# Patient Record
Sex: FEMALE | URBAN - METROPOLITAN AREA
[De-identification: names, ages, dates, MRNs, and addresses within clinical notes are randomized per-mention and may not be internally consistent; named-entity substitution may affect disease eponyms.]

---

## 2020-01-01 ENCOUNTER — HOSPITAL ENCOUNTER (OUTPATIENT)
Facility: MEDICAL CENTER | Age: 60
DRG: 388 | End: 2020-05-18
Payer: COMMERCIAL

## 2020-01-01 ENCOUNTER — APPOINTMENT (OUTPATIENT)
Dept: RADIOLOGY | Facility: MEDICAL CENTER | Age: 60
DRG: 388 | End: 2020-01-01
Attending: SURGERY
Payer: COMMERCIAL

## 2020-01-01 ENCOUNTER — APPOINTMENT (OUTPATIENT)
Dept: RADIOLOGY | Facility: MEDICAL CENTER | Age: 60
DRG: 388 | End: 2020-01-01
Attending: HOSPITALIST
Payer: COMMERCIAL

## 2020-01-01 ENCOUNTER — HOSPITAL ENCOUNTER (INPATIENT)
Facility: MEDICAL CENTER | Age: 60
LOS: 5 days | DRG: 388 | End: 2020-05-23
Attending: HOSPITALIST | Admitting: HOSPITALIST
Payer: COMMERCIAL

## 2020-01-01 ENCOUNTER — HOSPITAL ENCOUNTER (OUTPATIENT)
Dept: RADIOLOGY | Facility: MEDICAL CENTER | Age: 60
End: 2020-06-12
Payer: COMMERCIAL

## 2020-01-01 ENCOUNTER — APPOINTMENT (OUTPATIENT)
Dept: CARDIOLOGY | Facility: MEDICAL CENTER | Age: 60
DRG: 388 | End: 2020-01-01
Attending: HOSPITALIST
Payer: COMMERCIAL

## 2020-01-01 ENCOUNTER — APPOINTMENT (OUTPATIENT)
Dept: RADIOLOGY | Facility: MEDICAL CENTER | Age: 60
DRG: 388 | End: 2020-01-01
Attending: PHYSICIAN ASSISTANT
Payer: COMMERCIAL

## 2020-01-01 ENCOUNTER — PATIENT OUTREACH (OUTPATIENT)
Dept: HEALTH INFORMATION MANAGEMENT | Facility: OTHER | Age: 60
End: 2020-01-01

## 2020-01-01 VITALS
DIASTOLIC BLOOD PRESSURE: 77 MMHG | RESPIRATION RATE: 18 BRPM | HEIGHT: 62 IN | TEMPERATURE: 98.7 F | OXYGEN SATURATION: 99 % | SYSTOLIC BLOOD PRESSURE: 137 MMHG | HEART RATE: 67 BPM | WEIGHT: 101.19 LBS | BODY MASS INDEX: 18.62 KG/M2

## 2020-01-01 DIAGNOSIS — S22.42XA CLOSED FRACTURE OF MULTIPLE RIBS OF LEFT SIDE, INITIAL ENCOUNTER: ICD-10-CM

## 2020-01-01 DIAGNOSIS — J44.1 CHRONIC OBSTRUCTIVE PULMONARY DISEASE WITH ACUTE EXACERBATION (HCC): ICD-10-CM

## 2020-01-01 DIAGNOSIS — F51.01 PRIMARY INSOMNIA: ICD-10-CM

## 2020-01-01 LAB
ALBUMIN SERPL BCP-MCNC: 3.6 G/DL (ref 3.2–4.9)
ALBUMIN SERPL BCP-MCNC: 4.1 G/DL (ref 3.2–4.9)
ALBUMIN/GLOB SERPL: 2.1 G/DL
ALBUMIN/GLOB SERPL: 2.3 G/DL
ALP SERPL-CCNC: 66 U/L (ref 30–99)
ALP SERPL-CCNC: 80 U/L (ref 30–99)
ALT SERPL-CCNC: 13 U/L (ref 2–50)
ALT SERPL-CCNC: 19 U/L (ref 2–50)
AMYLASE FLD-CCNC: 8 U/L
ANION GAP SERPL CALC-SCNC: 10 MMOL/L (ref 7–16)
ANION GAP SERPL CALC-SCNC: 10 MMOL/L (ref 7–16)
ANION GAP SERPL CALC-SCNC: 12 MMOL/L (ref 7–16)
ANION GAP SERPL CALC-SCNC: 7 MMOL/L (ref 7–16)
ANION GAP SERPL CALC-SCNC: 9 MMOL/L (ref 7–16)
APPEARANCE FLD: CLEAR
AST SERPL-CCNC: 14 U/L (ref 12–45)
AST SERPL-CCNC: 8 U/L (ref 12–45)
BACTERIA FLD AEROBE CULT: NORMAL
BASOPHILS # BLD AUTO: 0.1 % (ref 0–1.8)
BASOPHILS # BLD AUTO: 0.1 % (ref 0–1.8)
BASOPHILS # BLD AUTO: 0.2 % (ref 0–1.8)
BASOPHILS # BLD: 0.01 K/UL (ref 0–0.12)
BASOPHILS # BLD: 0.01 K/UL (ref 0–0.12)
BASOPHILS # BLD: 0.02 K/UL (ref 0–0.12)
BILIRUB SERPL-MCNC: 0.5 MG/DL (ref 0.1–1.5)
BILIRUB SERPL-MCNC: 0.6 MG/DL (ref 0.1–1.5)
BODY FLD TYPE: NORMAL
BUN SERPL-MCNC: 11 MG/DL (ref 8–22)
BUN SERPL-MCNC: 14 MG/DL (ref 8–22)
BUN SERPL-MCNC: 14 MG/DL (ref 8–22)
BUN SERPL-MCNC: 15 MG/DL (ref 8–22)
BUN SERPL-MCNC: 16 MG/DL (ref 8–22)
CALCIUM SERPL-MCNC: 8.3 MG/DL (ref 8.5–10.5)
CALCIUM SERPL-MCNC: 8.6 MG/DL (ref 8.5–10.5)
CALCIUM SERPL-MCNC: 8.9 MG/DL (ref 8.5–10.5)
CALCIUM SERPL-MCNC: 9.1 MG/DL (ref 8.5–10.5)
CALCIUM SERPL-MCNC: 9.1 MG/DL (ref 8.5–10.5)
CHLORIDE SERPL-SCNC: 92 MMOL/L (ref 96–112)
CHLORIDE SERPL-SCNC: 93 MMOL/L (ref 96–112)
CHLORIDE SERPL-SCNC: 94 MMOL/L (ref 96–112)
CHLORIDE SERPL-SCNC: 95 MMOL/L (ref 96–112)
CHLORIDE SERPL-SCNC: 96 MMOL/L (ref 96–112)
CHOLEST SERPL-MCNC: 189 MG/DL (ref 100–199)
CO2 SERPL-SCNC: 28 MMOL/L (ref 20–33)
CO2 SERPL-SCNC: 29 MMOL/L (ref 20–33)
COLOR FLD: YELLOW
CREAT SERPL-MCNC: 0.36 MG/DL (ref 0.5–1.4)
CREAT SERPL-MCNC: 0.42 MG/DL (ref 0.5–1.4)
CREAT SERPL-MCNC: 0.44 MG/DL (ref 0.5–1.4)
CREAT SERPL-MCNC: 0.44 MG/DL (ref 0.5–1.4)
CREAT SERPL-MCNC: 0.46 MG/DL (ref 0.5–1.4)
CSF COMMENTS 1658: NORMAL
CYTOLOGY REG CYTOL: NORMAL
EKG IMPRESSION: NORMAL
EOSINOPHIL # BLD AUTO: 0 K/UL (ref 0–0.51)
EOSINOPHIL # BLD AUTO: 0.01 K/UL (ref 0–0.51)
EOSINOPHIL # BLD AUTO: 0.02 K/UL (ref 0–0.51)
EOSINOPHIL NFR BLD: 0 % (ref 0–6.9)
EOSINOPHIL NFR BLD: 0.1 % (ref 0–6.9)
EOSINOPHIL NFR BLD: 0.2 % (ref 0–6.9)
ERYTHROCYTE [DISTWIDTH] IN BLOOD BY AUTOMATED COUNT: 43.8 FL (ref 35.9–50)
ERYTHROCYTE [DISTWIDTH] IN BLOOD BY AUTOMATED COUNT: 45.7 FL (ref 35.9–50)
ERYTHROCYTE [DISTWIDTH] IN BLOOD BY AUTOMATED COUNT: 47.5 FL (ref 35.9–50)
EST. AVERAGE GLUCOSE BLD GHB EST-MCNC: 117 MG/DL
FUNGUS SPEC CULT: NORMAL
GLOBULIN SER CALC-MCNC: 1.6 G/DL (ref 1.9–3.5)
GLOBULIN SER CALC-MCNC: 2 G/DL (ref 1.9–3.5)
GLUCOSE FLD-MCNC: 117 MG/DL
GLUCOSE SERPL-MCNC: 102 MG/DL (ref 65–99)
GLUCOSE SERPL-MCNC: 105 MG/DL (ref 65–99)
GLUCOSE SERPL-MCNC: 113 MG/DL (ref 65–99)
GLUCOSE SERPL-MCNC: 84 MG/DL (ref 65–99)
GLUCOSE SERPL-MCNC: 85 MG/DL (ref 65–99)
GRAM STN SPEC: NORMAL
GRAM STN SPEC: NORMAL
HBA1C MFR BLD: 5.7 % (ref 0–5.6)
HCT VFR BLD AUTO: 37 % (ref 37–47)
HCT VFR BLD AUTO: 37.8 % (ref 37–47)
HCT VFR BLD AUTO: 42.5 % (ref 37–47)
HDLC SERPL-MCNC: 58 MG/DL
HGB BLD-MCNC: 12 G/DL (ref 12–16)
HGB BLD-MCNC: 12.5 G/DL (ref 12–16)
HGB BLD-MCNC: 13.9 G/DL (ref 12–16)
HISTIOCYTES NFR FLD: 10 %
IMM GRANULOCYTES # BLD AUTO: 0.03 K/UL (ref 0–0.11)
IMM GRANULOCYTES # BLD AUTO: 0.05 K/UL (ref 0–0.11)
IMM GRANULOCYTES # BLD AUTO: 0.06 K/UL (ref 0–0.11)
IMM GRANULOCYTES NFR BLD AUTO: 0.3 % (ref 0–0.9)
IMM GRANULOCYTES NFR BLD AUTO: 0.5 % (ref 0–0.9)
IMM GRANULOCYTES NFR BLD AUTO: 0.5 % (ref 0–0.9)
INR PPP: 1.04 (ref 0.87–1.13)
LACTATE BLD-SCNC: 0.9 MMOL/L (ref 0.5–2)
LACTATE BLD-SCNC: 1 MMOL/L (ref 0.5–2)
LACTATE BLD-SCNC: 1.1 MMOL/L (ref 0.5–2)
LACTATE BLD-SCNC: 1.3 MMOL/L (ref 0.5–2)
LDH FLD L TO P-CCNC: 50 U/L
LDLC SERPL CALC-MCNC: 93 MG/DL
LV EJECT FRACT  99904: 30
LV EJECT FRACT MOD 2C 99903: 49.42
LV EJECT FRACT MOD 4C 99902: 51.45
LV EJECT FRACT MOD BP 99901: 48.65
LYMPHOCYTES # BLD AUTO: 0.37 K/UL (ref 1–4.8)
LYMPHOCYTES # BLD AUTO: 1.03 K/UL (ref 1–4.8)
LYMPHOCYTES # BLD AUTO: 1.27 K/UL (ref 1–4.8)
LYMPHOCYTES NFR BLD: 11.7 % (ref 22–41)
LYMPHOCYTES NFR BLD: 3.7 % (ref 22–41)
LYMPHOCYTES NFR BLD: 8.7 % (ref 22–41)
LYMPHOCYTES NFR FLD: 21 %
MAGNESIUM SERPL-MCNC: 2.4 MG/DL (ref 1.5–2.5)
MCH RBC QN AUTO: 31.9 PG (ref 27–33)
MCH RBC QN AUTO: 32 PG (ref 27–33)
MCH RBC QN AUTO: 32 PG (ref 27–33)
MCHC RBC AUTO-ENTMCNC: 32.4 G/DL (ref 33.6–35)
MCHC RBC AUTO-ENTMCNC: 32.7 G/DL (ref 33.6–35)
MCHC RBC AUTO-ENTMCNC: 33.1 G/DL (ref 33.6–35)
MCV RBC AUTO: 96.4 FL (ref 81.4–97.8)
MCV RBC AUTO: 97.7 FL (ref 81.4–97.8)
MCV RBC AUTO: 98.7 FL (ref 81.4–97.8)
MESOTHL CELL NFR FLD: 4 %
MONOCYTES # BLD AUTO: 0.8 K/UL (ref 0–0.85)
MONOCYTES # BLD AUTO: 1.21 K/UL (ref 0–0.85)
MONOCYTES # BLD AUTO: 1.62 K/UL (ref 0–0.85)
MONOCYTES NFR BLD AUTO: 11.2 % (ref 0–13.4)
MONOCYTES NFR BLD AUTO: 13.6 % (ref 0–13.4)
MONOCYTES NFR BLD AUTO: 8 % (ref 0–13.4)
MONONUC CELLS NFR FLD: 27 %
MYCOBACTERIUM SPEC CULT: NORMAL
NEUTROPHILS # BLD AUTO: 8.25 K/UL (ref 2–7.15)
NEUTROPHILS # BLD AUTO: 8.78 K/UL (ref 2–7.15)
NEUTROPHILS # BLD AUTO: 9.15 K/UL (ref 2–7.15)
NEUTROPHILS NFR BLD: 76.3 % (ref 44–72)
NEUTROPHILS NFR BLD: 76.9 % (ref 44–72)
NEUTROPHILS NFR BLD: 87.9 % (ref 44–72)
NEUTROPHILS NFR FLD: 36 %
NRBC # BLD AUTO: 0 K/UL
NRBC BLD-RTO: 0 /100 WBC
NT-PROBNP SERPL IA-MCNC: 6109 PG/ML (ref 0–125)
PATH REV: NORMAL
PATH REV: NORMAL
PH FLD: 7.2 [PH]
PHOSPHATE SERPL-MCNC: 2.7 MG/DL (ref 2.5–4.5)
PLATELET # BLD AUTO: 223 K/UL (ref 164–446)
PLATELET # BLD AUTO: 245 K/UL (ref 164–446)
PLATELET # BLD AUTO: 324 K/UL (ref 164–446)
PMV BLD AUTO: 10.6 FL (ref 9–12.9)
PMV BLD AUTO: 10.7 FL (ref 9–12.9)
PMV BLD AUTO: 9.9 FL (ref 9–12.9)
POTASSIUM SERPL-SCNC: 3.8 MMOL/L (ref 3.6–5.5)
POTASSIUM SERPL-SCNC: 4 MMOL/L (ref 3.6–5.5)
POTASSIUM SERPL-SCNC: 4.4 MMOL/L (ref 3.6–5.5)
POTASSIUM SERPL-SCNC: 4.4 MMOL/L (ref 3.6–5.5)
POTASSIUM SERPL-SCNC: 4.7 MMOL/L (ref 3.6–5.5)
PROCALCITONIN SERPL-MCNC: <0.05 NG/ML
PROT FLD-MCNC: 2.2 G/DL
PROT SERPL-MCNC: 5.2 G/DL (ref 6–8.2)
PROT SERPL-MCNC: 6.1 G/DL (ref 6–8.2)
PROTHROMBIN TIME: 13.8 SEC (ref 12–14.6)
RBC # BLD AUTO: 3.75 M/UL (ref 4.2–5.4)
RBC # BLD AUTO: 3.92 M/UL (ref 4.2–5.4)
RBC # BLD AUTO: 4.35 M/UL (ref 4.2–5.4)
RBC # FLD: <2000 CELLS/UL
RHODAMINE-AURAMINE STN SPEC: NORMAL
RHODAMINE-AURAMINE STN SPEC: NORMAL
SIGNIFICANT IND 70042: NORMAL
SITE SITE: NORMAL
SODIUM SERPL-SCNC: 130 MMOL/L (ref 135–145)
SODIUM SERPL-SCNC: 131 MMOL/L (ref 135–145)
SODIUM SERPL-SCNC: 132 MMOL/L (ref 135–145)
SODIUM SERPL-SCNC: 132 MMOL/L (ref 135–145)
SODIUM SERPL-SCNC: 137 MMOL/L (ref 135–145)
SOURCE SOURCE: NORMAL
TRIGL SERPL-MCNC: 190 MG/DL (ref 0–149)
TROPONIN T SERPL-MCNC: 23 NG/L (ref 6–19)
TROPONIN T SERPL-MCNC: 27 NG/L (ref 6–19)
WBC # BLD AUTO: 10 K/UL (ref 4.8–10.8)
WBC # BLD AUTO: 10.8 K/UL (ref 4.8–10.8)
WBC # BLD AUTO: 11.9 K/UL (ref 4.8–10.8)
WBC # FLD: 187 CELLS/UL
WBC OTHER NFR FLD: 2 %

## 2020-01-01 PROCEDURE — 83615 LACTATE (LD) (LDH) ENZYME: CPT

## 2020-01-01 PROCEDURE — 700102 HCHG RX REV CODE 250 W/ 637 OVERRIDE(OP): Performed by: HOSPITALIST

## 2020-01-01 PROCEDURE — A9270 NON-COVERED ITEM OR SERVICE: HCPCS | Performed by: HOSPITALIST

## 2020-01-01 PROCEDURE — A9270 NON-COVERED ITEM OR SERVICE: HCPCS | Performed by: NURSE PRACTITIONER

## 2020-01-01 PROCEDURE — 99406 BEHAV CHNG SMOKING 3-10 MIN: CPT | Performed by: HOSPITALIST

## 2020-01-01 PROCEDURE — 87116 MYCOBACTERIA CULTURE: CPT

## 2020-01-01 PROCEDURE — 80048 BASIC METABOLIC PNL TOTAL CA: CPT

## 2020-01-01 PROCEDURE — 36415 COLL VENOUS BLD VENIPUNCTURE: CPT

## 2020-01-01 PROCEDURE — 94669 MECHANICAL CHEST WALL OSCILL: CPT

## 2020-01-01 PROCEDURE — 700102 HCHG RX REV CODE 250 W/ 637 OVERRIDE(OP): Performed by: PHYSICIAN ASSISTANT

## 2020-01-01 PROCEDURE — 94640 AIRWAY INHALATION TREATMENT: CPT

## 2020-01-01 PROCEDURE — 99232 SBSQ HOSP IP/OBS MODERATE 35: CPT | Performed by: HOSPITALIST

## 2020-01-01 PROCEDURE — 83605 ASSAY OF LACTIC ACID: CPT | Mod: 91

## 2020-01-01 PROCEDURE — 87070 CULTURE OTHR SPECIMN AEROBIC: CPT

## 2020-01-01 PROCEDURE — 87206 SMEAR FLUORESCENT/ACID STAI: CPT

## 2020-01-01 PROCEDURE — 83605 ASSAY OF LACTIC ACID: CPT

## 2020-01-01 PROCEDURE — 99233 SBSQ HOSP IP/OBS HIGH 50: CPT | Performed by: HOSPITALIST

## 2020-01-01 PROCEDURE — 700102 HCHG RX REV CODE 250 W/ 637 OVERRIDE(OP): Performed by: INTERNAL MEDICINE

## 2020-01-01 PROCEDURE — 99232 SBSQ HOSP IP/OBS MODERATE 35: CPT | Performed by: NURSE PRACTITIONER

## 2020-01-01 PROCEDURE — 84484 ASSAY OF TROPONIN QUANT: CPT | Mod: 91

## 2020-01-01 PROCEDURE — A9502 TC99M TETROFOSMIN: HCPCS

## 2020-01-01 PROCEDURE — 700111 HCHG RX REV CODE 636 W/ 250 OVERRIDE (IP): Performed by: NURSE PRACTITIONER

## 2020-01-01 PROCEDURE — 99358 PROLONG SERVICE W/O CONTACT: CPT | Performed by: HOSPITALIST

## 2020-01-01 PROCEDURE — 80053 COMPREHEN METABOLIC PANEL: CPT

## 2020-01-01 PROCEDURE — 700111 HCHG RX REV CODE 636 W/ 250 OVERRIDE (IP): Performed by: INTERNAL MEDICINE

## 2020-01-01 PROCEDURE — 700105 HCHG RX REV CODE 258: Performed by: HOSPITALIST

## 2020-01-01 PROCEDURE — 80500 HCHG CLINICAL PATH CONSULT-LIMITED: CPT

## 2020-01-01 PROCEDURE — 83036 HEMOGLOBIN GLYCOSYLATED A1C: CPT

## 2020-01-01 PROCEDURE — 84157 ASSAY OF PROTEIN OTHER: CPT

## 2020-01-01 PROCEDURE — A9270 NON-COVERED ITEM OR SERVICE: HCPCS | Performed by: PHYSICIAN ASSISTANT

## 2020-01-01 PROCEDURE — 700101 HCHG RX REV CODE 250: Performed by: HOSPITALIST

## 2020-01-01 PROCEDURE — A9270 NON-COVERED ITEM OR SERVICE: HCPCS | Performed by: INTERNAL MEDICINE

## 2020-01-01 PROCEDURE — 74270 X-RAY XM COLON 1CNTRST STD: CPT

## 2020-01-01 PROCEDURE — 88305 TISSUE EXAM BY PATHOLOGIST: CPT

## 2020-01-01 PROCEDURE — 700111 HCHG RX REV CODE 636 W/ 250 OVERRIDE (IP)

## 2020-01-01 PROCEDURE — 71045 X-RAY EXAM CHEST 1 VIEW: CPT

## 2020-01-01 PROCEDURE — 94760 N-INVAS EAR/PLS OXIMETRY 1: CPT

## 2020-01-01 PROCEDURE — 99239 HOSP IP/OBS DSCHRG MGMT >30: CPT | Performed by: HOSPITALIST

## 2020-01-01 PROCEDURE — 700111 HCHG RX REV CODE 636 W/ 250 OVERRIDE (IP): Performed by: HOSPITALIST

## 2020-01-01 PROCEDURE — 700102 HCHG RX REV CODE 250 W/ 637 OVERRIDE(OP): Performed by: NURSE PRACTITIONER

## 2020-01-01 PROCEDURE — 83880 ASSAY OF NATRIURETIC PEPTIDE: CPT

## 2020-01-01 PROCEDURE — 87015 SPECIMEN INFECT AGNT CONCNTJ: CPT | Mod: 91

## 2020-01-01 PROCEDURE — 85610 PROTHROMBIN TIME: CPT

## 2020-01-01 PROCEDURE — 87102 FUNGUS ISOLATION CULTURE: CPT

## 2020-01-01 PROCEDURE — 74018 RADEX ABDOMEN 1 VIEW: CPT

## 2020-01-01 PROCEDURE — 700101 HCHG RX REV CODE 250

## 2020-01-01 PROCEDURE — 93005 ELECTROCARDIOGRAM TRACING: CPT | Performed by: HOSPITALIST

## 2020-01-01 PROCEDURE — 83986 ASSAY PH BODY FLUID NOS: CPT

## 2020-01-01 PROCEDURE — 770001 HCHG ROOM/CARE - MED/SURG/GYN PRIV*

## 2020-01-01 PROCEDURE — 87205 SMEAR GRAM STAIN: CPT

## 2020-01-01 PROCEDURE — 93010 ELECTROCARDIOGRAM REPORT: CPT | Performed by: INTERNAL MEDICINE

## 2020-01-01 PROCEDURE — 700111 HCHG RX REV CODE 636 W/ 250 OVERRIDE (IP): Performed by: PHYSICIAN ASSISTANT

## 2020-01-01 PROCEDURE — 85025 COMPLETE CBC W/AUTO DIFF WBC: CPT

## 2020-01-01 PROCEDURE — 82945 GLUCOSE OTHER FLUID: CPT

## 2020-01-01 PROCEDURE — 80061 LIPID PANEL: CPT

## 2020-01-01 PROCEDURE — 0W9B3ZX DRAINAGE OF LEFT PLEURAL CAVITY, PERCUTANEOUS APPROACH, DIAGNOSTIC: ICD-10-PCS | Performed by: RADIOLOGY

## 2020-01-01 PROCEDURE — 93306 TTE W/DOPPLER COMPLETE: CPT

## 2020-01-01 PROCEDURE — 82150 ASSAY OF AMYLASE: CPT

## 2020-01-01 PROCEDURE — 84100 ASSAY OF PHOSPHORUS: CPT

## 2020-01-01 PROCEDURE — 4410569 US-THORACENTESIS PUNCTURE

## 2020-01-01 PROCEDURE — 93306 TTE W/DOPPLER COMPLETE: CPT | Mod: 26 | Performed by: INTERNAL MEDICINE

## 2020-01-01 PROCEDURE — 99223 1ST HOSP IP/OBS HIGH 75: CPT | Mod: 25 | Performed by: HOSPITALIST

## 2020-01-01 PROCEDURE — 99222 1ST HOSP IP/OBS MODERATE 55: CPT | Performed by: INTERNAL MEDICINE

## 2020-01-01 PROCEDURE — 89051 BODY FLUID CELL COUNT: CPT

## 2020-01-01 PROCEDURE — 84145 PROCALCITONIN (PCT): CPT

## 2020-01-01 PROCEDURE — 83735 ASSAY OF MAGNESIUM: CPT

## 2020-01-01 PROCEDURE — 88112 CYTOPATH CELL ENHANCE TECH: CPT

## 2020-01-01 PROCEDURE — BB4BZZZ ULTRASONOGRAPHY OF PLEURA: ICD-10-PCS | Performed by: RADIOLOGY

## 2020-01-01 PROCEDURE — 700117 HCHG RX CONTRAST REV CODE 255: Performed by: HOSPITALIST

## 2020-01-01 RX ORDER — TIZANIDINE 4 MG/1
1 TABLET ORAL 2 TIMES DAILY
Status: ON HOLD | COMMUNITY
End: 2020-01-01 | Stop reason: SDUPTHER

## 2020-01-01 RX ORDER — AMOXICILLIN 250 MG
2 CAPSULE ORAL 2 TIMES DAILY
Status: DISCONTINUED | OUTPATIENT
Start: 2020-01-01 | End: 2020-01-01

## 2020-01-01 RX ORDER — PROMETHAZINE HYDROCHLORIDE 25 MG/1
12.5-25 SUPPOSITORY RECTAL EVERY 4 HOURS PRN
Status: DISCONTINUED | OUTPATIENT
Start: 2020-01-01 | End: 2020-01-01 | Stop reason: HOSPADM

## 2020-01-01 RX ORDER — DEXTROSE MONOHYDRATE, SODIUM CHLORIDE, AND POTASSIUM CHLORIDE 50; 2.24; 4.5 G/1000ML; G/1000ML; G/1000ML
INJECTION, SOLUTION INTRAVENOUS CONTINUOUS
Status: DISCONTINUED | OUTPATIENT
Start: 2020-01-01 | End: 2020-01-01

## 2020-01-01 RX ORDER — KETOROLAC TROMETHAMINE 30 MG/ML
30 INJECTION, SOLUTION INTRAMUSCULAR; INTRAVENOUS ONCE
Status: COMPLETED | OUTPATIENT
Start: 2020-01-01 | End: 2020-01-01

## 2020-01-01 RX ORDER — TIZANIDINE 4 MG/1
1 TABLET ORAL 2 TIMES DAILY
Qty: 30 TAB | Refills: 1 | Status: SHIPPED | OUTPATIENT
Start: 2020-01-01

## 2020-01-01 RX ORDER — ZOLPIDEM TARTRATE 5 MG/1
5 TABLET ORAL NIGHTLY PRN
Qty: 3 TAB | Refills: 0 | Status: SHIPPED | OUTPATIENT
Start: 2020-01-01 | End: 2020-01-01

## 2020-01-01 RX ORDER — OMEPRAZOLE 20 MG/1
20 CAPSULE, DELAYED RELEASE ORAL DAILY
Status: DISCONTINUED | OUTPATIENT
Start: 2020-01-01 | End: 2020-01-01 | Stop reason: HOSPADM

## 2020-01-01 RX ORDER — AMOXICILLIN 250 MG
2 CAPSULE ORAL 2 TIMES DAILY
Status: DISCONTINUED | OUTPATIENT
Start: 2020-01-01 | End: 2020-01-01 | Stop reason: HOSPADM

## 2020-01-01 RX ORDER — LISINOPRIL 2.5 MG/1
2.5 TABLET ORAL DAILY
Qty: 30 TAB | Refills: 1 | Status: SHIPPED | OUTPATIENT
Start: 2020-01-01 | End: 2020-01-01

## 2020-01-01 RX ORDER — NICOTINE 21 MG/24HR
1 PATCH, TRANSDERMAL 24 HOURS TRANSDERMAL EVERY 24 HOURS
Qty: 14 PATCH | Refills: 0 | Status: SHIPPED | OUTPATIENT
Start: 2020-01-01 | End: 2020-01-01

## 2020-01-01 RX ORDER — SPIRONOLACTONE 25 MG/1
12.5 TABLET ORAL
Status: DISCONTINUED | OUTPATIENT
Start: 2020-01-01 | End: 2020-01-01 | Stop reason: HOSPADM

## 2020-01-01 RX ORDER — METOPROLOL SUCCINATE 25 MG/1
12.5 TABLET, EXTENDED RELEASE ORAL DAILY
Qty: 30 TAB | Refills: 1 | Status: SHIPPED | OUTPATIENT
Start: 2020-01-01 | End: 2020-01-01

## 2020-01-01 RX ORDER — METOPROLOL SUCCINATE 25 MG/1
12.5 TABLET, EXTENDED RELEASE ORAL
Status: DISCONTINUED | OUTPATIENT
Start: 2020-01-01 | End: 2020-01-01 | Stop reason: HOSPADM

## 2020-01-01 RX ORDER — SPIRONOLACTONE 25 MG/1
12.5 TABLET ORAL DAILY
Qty: 30 TAB | Refills: 1 | Status: SHIPPED | OUTPATIENT
Start: 2020-01-01 | End: 2020-01-01

## 2020-01-01 RX ORDER — SPIRONOLACTONE 25 MG/1
12.5 TABLET ORAL DAILY
Qty: 30 TAB | Refills: 1 | Status: SHIPPED | OUTPATIENT
Start: 2020-01-01

## 2020-01-01 RX ORDER — OXYCODONE HYDROCHLORIDE 5 MG/1
5 TABLET ORAL EVERY 4 HOURS PRN
Status: DISCONTINUED | OUTPATIENT
Start: 2020-01-01 | End: 2020-01-01

## 2020-01-01 RX ORDER — IPRATROPIUM BROMIDE AND ALBUTEROL SULFATE 2.5; .5 MG/3ML; MG/3ML
3 SOLUTION RESPIRATORY (INHALATION)
Status: DISCONTINUED | OUTPATIENT
Start: 2020-01-01 | End: 2020-01-01

## 2020-01-01 RX ORDER — SODIUM CHLORIDE 9 MG/ML
INJECTION, SOLUTION INTRAVENOUS
Status: ACTIVE
Start: 2020-01-01 | End: 2020-01-01

## 2020-01-01 RX ORDER — ASPIRIN 81 MG/1
81 TABLET ORAL DAILY
Qty: 30 TAB | Refills: 1 | Status: SHIPPED | OUTPATIENT
Start: 2020-01-01 | End: 2020-01-01

## 2020-01-01 RX ORDER — GUAIFENESIN 600 MG/1
1200 TABLET, EXTENDED RELEASE ORAL 2 TIMES DAILY
Status: DISCONTINUED | OUTPATIENT
Start: 2020-01-01 | End: 2020-01-01 | Stop reason: HOSPADM

## 2020-01-01 RX ORDER — MORPHINE SULFATE 4 MG/ML
2 INJECTION, SOLUTION INTRAMUSCULAR; INTRAVENOUS ONCE
Status: COMPLETED | OUTPATIENT
Start: 2020-01-01 | End: 2020-01-01

## 2020-01-01 RX ORDER — BISACODYL 10 MG
10 SUPPOSITORY, RECTAL RECTAL
Status: DISCONTINUED | OUTPATIENT
Start: 2020-01-01 | End: 2020-01-01 | Stop reason: HOSPADM

## 2020-01-01 RX ORDER — FUROSEMIDE 10 MG/ML
20 INJECTION INTRAMUSCULAR; INTRAVENOUS ONCE
Status: COMPLETED | OUTPATIENT
Start: 2020-01-01 | End: 2020-01-01

## 2020-01-01 RX ORDER — IPRATROPIUM BROMIDE AND ALBUTEROL SULFATE 2.5; .5 MG/3ML; MG/3ML
SOLUTION RESPIRATORY (INHALATION)
Status: COMPLETED
Start: 2020-01-01 | End: 2020-01-01

## 2020-01-01 RX ORDER — TRAMADOL HYDROCHLORIDE 50 MG/1
50 TABLET ORAL EVERY 6 HOURS PRN
Status: DISCONTINUED | OUTPATIENT
Start: 2020-01-01 | End: 2020-01-01 | Stop reason: HOSPADM

## 2020-01-01 RX ORDER — OXYCODONE HYDROCHLORIDE 5 MG/1
5 TABLET ORAL ONCE
Status: ACTIVE | OUTPATIENT
Start: 2020-01-01 | End: 2020-01-01

## 2020-01-01 RX ORDER — BISACODYL 10 MG
10 SUPPOSITORY, RECTAL RECTAL
Status: DISCONTINUED | OUTPATIENT
Start: 2020-01-01 | End: 2020-01-01

## 2020-01-01 RX ORDER — POLYETHYLENE GLYCOL 3350 17 G/17G
1 POWDER, FOR SOLUTION ORAL
Status: DISCONTINUED | OUTPATIENT
Start: 2020-01-01 | End: 2020-01-01

## 2020-01-01 RX ORDER — NICOTINE 21 MG/24HR
14 PATCH, TRANSDERMAL 24 HOURS TRANSDERMAL
Status: DISCONTINUED | OUTPATIENT
Start: 2020-01-01 | End: 2020-01-01 | Stop reason: HOSPADM

## 2020-01-01 RX ORDER — MONTELUKAST SODIUM 10 MG/1
10 TABLET ORAL DAILY
Status: ON HOLD | COMMUNITY
End: 2020-01-01 | Stop reason: SDUPTHER

## 2020-01-01 RX ORDER — ONDANSETRON 4 MG/1
4 TABLET, ORALLY DISINTEGRATING ORAL EVERY 4 HOURS PRN
Status: DISCONTINUED | OUTPATIENT
Start: 2020-01-01 | End: 2020-01-01 | Stop reason: HOSPADM

## 2020-01-01 RX ORDER — POLYETHYLENE GLYCOL 3350 17 G/17G
1 POWDER, FOR SOLUTION ORAL DAILY
Status: DISCONTINUED | OUTPATIENT
Start: 2020-01-01 | End: 2020-01-01 | Stop reason: HOSPADM

## 2020-01-01 RX ORDER — ALBUTEROL SULFATE 90 UG/1
2 AEROSOL, METERED RESPIRATORY (INHALATION) EVERY 6 HOURS PRN
Qty: 1 INHALER | Refills: 1 | Status: SHIPPED | OUTPATIENT
Start: 2020-01-01

## 2020-01-01 RX ORDER — LIDOCAINE 50 MG/G
1 PATCH TOPICAL EVERY 24 HOURS
Status: DISCONTINUED | OUTPATIENT
Start: 2020-01-01 | End: 2020-01-01

## 2020-01-01 RX ORDER — LEVALBUTEROL INHALATION SOLUTION 0.63 MG/3ML
0.63 SOLUTION RESPIRATORY (INHALATION) EVERY 4 HOURS PRN
Status: ON HOLD | COMMUNITY
End: 2020-01-01 | Stop reason: SDUPTHER

## 2020-01-01 RX ORDER — POLYETHYLENE GLYCOL 3350 17 G/17G
17 POWDER, FOR SOLUTION ORAL DAILY
Qty: 30 EACH | Refills: 1 | Status: SHIPPED | OUTPATIENT
Start: 2020-01-01

## 2020-01-01 RX ORDER — OMEPRAZOLE 20 MG/1
20 CAPSULE, DELAYED RELEASE ORAL DAILY
Qty: 30 CAP | Refills: 1 | Status: SHIPPED | OUTPATIENT
Start: 2020-01-01

## 2020-01-01 RX ORDER — ACETAMINOPHEN 325 MG/1
650 TABLET ORAL EVERY 6 HOURS PRN
Status: DISCONTINUED | OUTPATIENT
Start: 2020-01-01 | End: 2020-01-01 | Stop reason: HOSPADM

## 2020-01-01 RX ORDER — IPRATROPIUM BROMIDE AND ALBUTEROL SULFATE 2.5; .5 MG/3ML; MG/3ML
3 SOLUTION RESPIRATORY (INHALATION)
Status: DISCONTINUED | OUTPATIENT
Start: 2020-01-01 | End: 2020-01-01 | Stop reason: HOSPADM

## 2020-01-01 RX ORDER — HYDROCODONE BITARTRATE AND ACETAMINOPHEN 10; 325 MG/1; MG/1
1-2 TABLET ORAL EVERY 6 HOURS PRN
Status: ON HOLD | COMMUNITY
End: 2020-01-01 | Stop reason: SDUPTHER

## 2020-01-01 RX ORDER — POLYETHYLENE GLYCOL 3350 17 G/17G
17 POWDER, FOR SOLUTION ORAL DAILY
Qty: 30 EACH | Refills: 1 | Status: SHIPPED | OUTPATIENT
Start: 2020-01-01 | End: 2020-01-01

## 2020-01-01 RX ORDER — HYDROCODONE BITARTRATE AND ACETAMINOPHEN 10; 325 MG/1; MG/1
1 TABLET ORAL EVERY 6 HOURS PRN
Qty: 12 TAB | Refills: 0 | Status: SHIPPED | OUTPATIENT
Start: 2020-01-01 | End: 2020-01-01 | Stop reason: SDUPTHER

## 2020-01-01 RX ORDER — PROMETHAZINE HYDROCHLORIDE 25 MG/1
12.5-25 TABLET ORAL EVERY 4 HOURS PRN
Status: DISCONTINUED | OUTPATIENT
Start: 2020-01-01 | End: 2020-01-01 | Stop reason: HOSPADM

## 2020-01-01 RX ORDER — KETOROLAC TROMETHAMINE 30 MG/ML
30 INJECTION, SOLUTION INTRAMUSCULAR; INTRAVENOUS EVERY 6 HOURS PRN
Status: DISCONTINUED | OUTPATIENT
Start: 2020-01-01 | End: 2020-01-01

## 2020-01-01 RX ORDER — LOSARTAN POTASSIUM 50 MG/1
12.5 TABLET ORAL
Status: DISCONTINUED | OUTPATIENT
Start: 2020-01-01 | End: 2020-01-01

## 2020-01-01 RX ORDER — ATORVASTATIN CALCIUM 40 MG/1
40 TABLET, FILM COATED ORAL EVERY EVENING
Qty: 30 TAB | Refills: 1 | Status: SHIPPED | OUTPATIENT
Start: 2020-01-01 | End: 2020-01-01

## 2020-01-01 RX ORDER — REGADENOSON 0.08 MG/ML
INJECTION, SOLUTION INTRAVENOUS
Status: COMPLETED
Start: 2020-01-01 | End: 2020-01-01

## 2020-01-01 RX ORDER — LISINOPRIL 2.5 MG/1
2.5 TABLET ORAL DAILY
Qty: 30 TAB | Refills: 1 | Status: SHIPPED | OUTPATIENT
Start: 2020-01-01

## 2020-01-01 RX ORDER — DOXYCYCLINE 100 MG/1
100 TABLET ORAL EVERY 12 HOURS
Status: DISCONTINUED | OUTPATIENT
Start: 2020-01-01 | End: 2020-01-01

## 2020-01-01 RX ORDER — MONTELUKAST SODIUM 10 MG/1
10 TABLET ORAL DAILY
Qty: 30 TAB | Refills: 1 | Status: SHIPPED | OUTPATIENT
Start: 2020-01-01

## 2020-01-01 RX ORDER — ATORVASTATIN CALCIUM 40 MG/1
40 TABLET, FILM COATED ORAL EVERY EVENING
Status: DISCONTINUED | OUTPATIENT
Start: 2020-01-01 | End: 2020-01-01 | Stop reason: HOSPADM

## 2020-01-01 RX ORDER — OXYCODONE HYDROCHLORIDE 5 MG/1
5-10 TABLET ORAL EVERY 4 HOURS PRN
Status: DISCONTINUED | OUTPATIENT
Start: 2020-01-01 | End: 2020-01-01 | Stop reason: HOSPADM

## 2020-01-01 RX ORDER — LEVALBUTEROL INHALATION SOLUTION 0.63 MG/3ML
0.63 SOLUTION RESPIRATORY (INHALATION) EVERY 4 HOURS PRN
Qty: 24 ML | Refills: 1 | Status: SHIPPED | OUTPATIENT
Start: 2020-01-01

## 2020-01-01 RX ORDER — HYDRALAZINE HYDROCHLORIDE 20 MG/ML
20 INJECTION INTRAMUSCULAR; INTRAVENOUS EVERY 6 HOURS PRN
Status: DISCONTINUED | OUTPATIENT
Start: 2020-01-01 | End: 2020-01-01 | Stop reason: HOSPADM

## 2020-01-01 RX ORDER — ONDANSETRON 2 MG/ML
4 INJECTION INTRAMUSCULAR; INTRAVENOUS EVERY 4 HOURS PRN
Status: DISCONTINUED | OUTPATIENT
Start: 2020-01-01 | End: 2020-01-01 | Stop reason: HOSPADM

## 2020-01-01 RX ORDER — LISINOPRIL 5 MG/1
2.5 TABLET ORAL
Status: DISCONTINUED | OUTPATIENT
Start: 2020-01-01 | End: 2020-01-01 | Stop reason: HOSPADM

## 2020-01-01 RX ORDER — SODIUM CHLORIDE 9 MG/ML
INJECTION, SOLUTION INTRAVENOUS CONTINUOUS
Status: DISCONTINUED | OUTPATIENT
Start: 2020-01-01 | End: 2020-01-01

## 2020-01-01 RX ORDER — ATORVASTATIN CALCIUM 40 MG/1
40 TABLET, FILM COATED ORAL EVERY EVENING
Qty: 30 TAB | Refills: 1 | Status: SHIPPED | OUTPATIENT
Start: 2020-01-01

## 2020-01-01 RX ORDER — ASPIRIN 81 MG/1
81 TABLET ORAL DAILY
Qty: 30 TAB | Refills: 1 | Status: SHIPPED | OUTPATIENT
Start: 2020-01-01

## 2020-01-01 RX ORDER — AMOXICILLIN AND CLAVULANATE POTASSIUM 875; 125 MG/1; MG/1
1 TABLET, FILM COATED ORAL 2 TIMES DAILY
Status: ON HOLD | COMMUNITY
End: 2020-01-01

## 2020-01-01 RX ORDER — OMEPRAZOLE 20 MG/1
20 CAPSULE, DELAYED RELEASE ORAL DAILY
Status: ON HOLD | COMMUNITY
End: 2020-01-01 | Stop reason: SDUPTHER

## 2020-01-01 RX ORDER — METHYLPREDNISOLONE SODIUM SUCCINATE 40 MG/ML
40 INJECTION, POWDER, LYOPHILIZED, FOR SOLUTION INTRAMUSCULAR; INTRAVENOUS EVERY 6 HOURS
Status: DISCONTINUED | OUTPATIENT
Start: 2020-01-01 | End: 2020-01-01

## 2020-01-01 RX ORDER — METHYLPREDNISOLONE SODIUM SUCCINATE 40 MG/ML
40 INJECTION, POWDER, LYOPHILIZED, FOR SOLUTION INTRAMUSCULAR; INTRAVENOUS EVERY 12 HOURS
Status: DISCONTINUED | OUTPATIENT
Start: 2020-01-01 | End: 2020-01-01

## 2020-01-01 RX ORDER — METOPROLOL SUCCINATE 25 MG/1
12.5 TABLET, EXTENDED RELEASE ORAL DAILY
Qty: 30 TAB | Refills: 1 | Status: SHIPPED | OUTPATIENT
Start: 2020-01-01

## 2020-01-01 RX ORDER — ZOLPIDEM TARTRATE 5 MG/1
5 TABLET ORAL NIGHTLY PRN
Status: ON HOLD | COMMUNITY
End: 2020-01-01 | Stop reason: SDUPTHER

## 2020-01-01 RX ORDER — HYDROCODONE BITARTRATE AND ACETAMINOPHEN 10; 325 MG/1; MG/1
1 TABLET ORAL EVERY 6 HOURS PRN
Qty: 12 TAB | Refills: 0 | Status: SHIPPED | OUTPATIENT
Start: 2020-01-01 | End: 2020-01-01

## 2020-01-01 RX ORDER — PROCHLORPERAZINE EDISYLATE 5 MG/ML
5-10 INJECTION INTRAMUSCULAR; INTRAVENOUS EVERY 4 HOURS PRN
Status: DISCONTINUED | OUTPATIENT
Start: 2020-01-01 | End: 2020-01-01 | Stop reason: HOSPADM

## 2020-01-01 RX ORDER — MORPHINE SULFATE 4 MG/ML
1 INJECTION, SOLUTION INTRAMUSCULAR; INTRAVENOUS ONCE
Status: DISCONTINUED | OUTPATIENT
Start: 2020-01-01 | End: 2020-01-01

## 2020-01-01 RX ADMIN — DOCUSATE SODIUM 50 MG AND SENNOSIDES 8.6 MG 2 TABLET: 8.6; 5 TABLET, FILM COATED ORAL at 05:52

## 2020-01-01 RX ADMIN — TRAMADOL HYDROCHLORIDE 50 MG: 50 TABLET, FILM COATED ORAL at 06:38

## 2020-01-01 RX ADMIN — POTASSIUM CHLORIDE, DEXTROSE MONOHYDRATE AND SODIUM CHLORIDE: 224; 5; 450 INJECTION, SOLUTION INTRAVENOUS at 05:49

## 2020-01-01 RX ADMIN — DOXYCYCLINE 100 MG: 100 TABLET ORAL at 22:55

## 2020-01-01 RX ADMIN — OXYCODONE HYDROCHLORIDE 5 MG: 5 TABLET ORAL at 10:12

## 2020-01-01 RX ADMIN — GUAIFENESIN 1200 MG: 600 TABLET, EXTENDED RELEASE ORAL at 22:55

## 2020-01-01 RX ADMIN — ACETAMINOPHEN 650 MG: 325 TABLET, FILM COATED ORAL at 23:00

## 2020-01-01 RX ADMIN — NICOTINE 14 MG: 14 PATCH TRANSDERMAL at 14:59

## 2020-01-01 RX ADMIN — NICOTINE 14 MG: 14 PATCH TRANSDERMAL at 06:38

## 2020-01-01 RX ADMIN — GUAIFENESIN 1200 MG: 600 TABLET, EXTENDED RELEASE ORAL at 06:45

## 2020-01-01 RX ADMIN — ACETAMINOPHEN 650 MG: 325 TABLET, FILM COATED ORAL at 06:45

## 2020-01-01 RX ADMIN — TRAMADOL HYDROCHLORIDE 50 MG: 50 TABLET, FILM COATED ORAL at 00:46

## 2020-01-01 RX ADMIN — OMEPRAZOLE 20 MG: 20 CAPSULE, DELAYED RELEASE ORAL at 06:38

## 2020-01-01 RX ADMIN — CEFTRIAXONE SODIUM 1 G: 1 INJECTION, POWDER, FOR SOLUTION INTRAMUSCULAR; INTRAVENOUS at 17:01

## 2020-01-01 RX ADMIN — OXYCODONE 10 MG: 5 TABLET ORAL at 12:36

## 2020-01-01 RX ADMIN — TRAMADOL HYDROCHLORIDE 50 MG: 50 TABLET, FILM COATED ORAL at 05:52

## 2020-01-01 RX ADMIN — DOCUSATE SODIUM 50 MG AND SENNOSIDES 8.6 MG 2 TABLET: 8.6; 5 TABLET, FILM COATED ORAL at 06:38

## 2020-01-01 RX ADMIN — SENNOSIDES AND DOCUSATE SODIUM 2 TABLET: 8.6; 5 TABLET ORAL at 05:35

## 2020-01-01 RX ADMIN — METHYLPREDNISOLONE SODIUM SUCCINATE 40 MG: 40 INJECTION, POWDER, FOR SOLUTION INTRAMUSCULAR; INTRAVENOUS at 05:30

## 2020-01-01 RX ADMIN — TRAMADOL HYDROCHLORIDE 50 MG: 50 TABLET, FILM COATED ORAL at 23:34

## 2020-01-01 RX ADMIN — ATORVASTATIN CALCIUM 40 MG: 40 TABLET, FILM COATED ORAL at 18:08

## 2020-01-01 RX ADMIN — GUAIFENESIN 1200 MG: 600 TABLET, EXTENDED RELEASE ORAL at 05:51

## 2020-01-01 RX ADMIN — IPRATROPIUM BROMIDE AND ALBUTEROL SULFATE 3 ML: .5; 3 SOLUTION RESPIRATORY (INHALATION) at 02:44

## 2020-01-01 RX ADMIN — ACETAMINOPHEN 650 MG: 325 TABLET, FILM COATED ORAL at 23:35

## 2020-01-01 RX ADMIN — ATORVASTATIN CALCIUM 40 MG: 40 TABLET, FILM COATED ORAL at 17:12

## 2020-01-01 RX ADMIN — METOPROLOL SUCCINATE 12.5 MG: 25 TABLET, EXTENDED RELEASE ORAL at 16:58

## 2020-01-01 RX ADMIN — METOPROLOL SUCCINATE 12.5 MG: 25 TABLET, EXTENDED RELEASE ORAL at 06:39

## 2020-01-01 RX ADMIN — OMEPRAZOLE 20 MG: 20 CAPSULE, DELAYED RELEASE ORAL at 05:21

## 2020-01-01 RX ADMIN — LISINOPRIL 2.5 MG: 5 TABLET ORAL at 08:03

## 2020-01-01 RX ADMIN — IPRATROPIUM BROMIDE AND ALBUTEROL SULFATE 3 ML: .5; 3 SOLUTION RESPIRATORY (INHALATION) at 14:37

## 2020-01-01 RX ADMIN — TRAMADOL HYDROCHLORIDE 50 MG: 50 TABLET, FILM COATED ORAL at 14:42

## 2020-01-01 RX ADMIN — LOSARTAN POTASSIUM 12.5 MG: 50 TABLET, FILM COATED ORAL at 06:39

## 2020-01-01 RX ADMIN — METOPROLOL SUCCINATE 12.5 MG: 25 TABLET, EXTENDED RELEASE ORAL at 05:22

## 2020-01-01 RX ADMIN — METHYLPREDNISOLONE SODIUM SUCCINATE 40 MG: 40 INJECTION, POWDER, FOR SOLUTION INTRAMUSCULAR; INTRAVENOUS at 05:36

## 2020-01-01 RX ADMIN — SPIRONOLACTONE 12.5 MG: 25 TABLET ORAL at 08:55

## 2020-01-01 RX ADMIN — ASPIRIN 81 MG: 81 TABLET, COATED ORAL at 06:38

## 2020-01-01 RX ADMIN — DOCUSATE SODIUM 50 MG AND SENNOSIDES 8.6 MG 2 TABLET: 8.6; 5 TABLET, FILM COATED ORAL at 17:46

## 2020-01-01 RX ADMIN — OXYCODONE 5 MG: 5 TABLET ORAL at 00:46

## 2020-01-01 RX ADMIN — METHYLPREDNISOLONE SODIUM SUCCINATE 40 MG: 40 INJECTION, POWDER, FOR SOLUTION INTRAMUSCULAR; INTRAVENOUS at 17:00

## 2020-01-01 RX ADMIN — SENNOSIDES AND DOCUSATE SODIUM 2 TABLET: 8.6; 5 TABLET ORAL at 05:21

## 2020-01-01 RX ADMIN — ASPIRIN 81 MG: 81 TABLET, COATED ORAL at 05:35

## 2020-01-01 RX ADMIN — OXYCODONE HYDROCHLORIDE 5 MG: 5 TABLET ORAL at 16:58

## 2020-01-01 RX ADMIN — CEFTRIAXONE SODIUM 1 G: 1 INJECTION, POWDER, FOR SOLUTION INTRAMUSCULAR; INTRAVENOUS at 22:55

## 2020-01-01 RX ADMIN — OXYCODONE HYDROCHLORIDE 5 MG: 5 TABLET ORAL at 18:08

## 2020-01-01 RX ADMIN — GUAIFENESIN 1200 MG: 600 TABLET, EXTENDED RELEASE ORAL at 17:08

## 2020-01-01 RX ADMIN — OXYCODONE HYDROCHLORIDE 5 MG: 5 TABLET ORAL at 09:40

## 2020-01-01 RX ADMIN — TRAMADOL HYDROCHLORIDE 50 MG: 50 TABLET, FILM COATED ORAL at 12:34

## 2020-01-01 RX ADMIN — IPRATROPIUM BROMIDE AND ALBUTEROL SULFATE 3 ML: .5; 3 SOLUTION RESPIRATORY (INHALATION) at 07:02

## 2020-01-01 RX ADMIN — TRAMADOL HYDROCHLORIDE 50 MG: 50 TABLET, FILM COATED ORAL at 17:08

## 2020-01-01 RX ADMIN — GUAIFENESIN 1200 MG: 600 TABLET, EXTENDED RELEASE ORAL at 05:21

## 2020-01-01 RX ADMIN — IPRATROPIUM BROMIDE AND ALBUTEROL SULFATE 3 ML: .5; 3 SOLUTION RESPIRATORY (INHALATION) at 17:43

## 2020-01-01 RX ADMIN — IPRATROPIUM BROMIDE AND ALBUTEROL SULFATE 3 ML: .5; 3 SOLUTION RESPIRATORY (INHALATION) at 14:47

## 2020-01-01 RX ADMIN — METHYLPREDNISOLONE SODIUM SUCCINATE 40 MG: 40 INJECTION, POWDER, FOR SOLUTION INTRAMUSCULAR; INTRAVENOUS at 01:16

## 2020-01-01 RX ADMIN — ATORVASTATIN CALCIUM 40 MG: 40 TABLET, FILM COATED ORAL at 17:08

## 2020-01-01 RX ADMIN — POLYETHYLENE GLYCOL 3350 1 PACKET: 17 POWDER, FOR SOLUTION ORAL at 05:51

## 2020-01-01 RX ADMIN — GUAIFENESIN 1200 MG: 600 TABLET, EXTENDED RELEASE ORAL at 17:00

## 2020-01-01 RX ADMIN — LOSARTAN POTASSIUM 12.5 MG: 50 TABLET, FILM COATED ORAL at 05:52

## 2020-01-01 RX ADMIN — METOPROLOL SUCCINATE 12.5 MG: 25 TABLET, EXTENDED RELEASE ORAL at 05:52

## 2020-01-01 RX ADMIN — IPRATROPIUM BROMIDE AND ALBUTEROL SULFATE 3 ML: .5; 3 SOLUTION RESPIRATORY (INHALATION) at 22:31

## 2020-01-01 RX ADMIN — METHYLPREDNISOLONE SODIUM SUCCINATE 40 MG: 40 INJECTION, POWDER, FOR SOLUTION INTRAMUSCULAR; INTRAVENOUS at 23:00

## 2020-01-01 RX ADMIN — IPRATROPIUM BROMIDE AND ALBUTEROL SULFATE 3 ML: .5; 3 SOLUTION RESPIRATORY (INHALATION) at 22:52

## 2020-01-01 RX ADMIN — DOXYCYCLINE 100 MG: 100 TABLET ORAL at 16:58

## 2020-01-01 RX ADMIN — ATORVASTATIN CALCIUM 40 MG: 40 TABLET, FILM COATED ORAL at 17:46

## 2020-01-01 RX ADMIN — OMEPRAZOLE 20 MG: 20 CAPSULE, DELAYED RELEASE ORAL at 06:45

## 2020-01-01 RX ADMIN — DOXYCYCLINE 100 MG: 100 TABLET ORAL at 06:45

## 2020-01-01 RX ADMIN — DOCUSATE SODIUM 50 MG AND SENNOSIDES 8.6 MG 2 TABLET: 8.6; 5 TABLET, FILM COATED ORAL at 22:55

## 2020-01-01 RX ADMIN — LOSARTAN POTASSIUM 12.5 MG: 50 TABLET, FILM COATED ORAL at 05:22

## 2020-01-01 RX ADMIN — IPRATROPIUM BROMIDE AND ALBUTEROL SULFATE 3 ML: .5; 3 SOLUTION RESPIRATORY (INHALATION) at 04:28

## 2020-01-01 RX ADMIN — METHYLPREDNISOLONE SODIUM SUCCINATE 40 MG: 40 INJECTION, POWDER, FOR SOLUTION INTRAMUSCULAR; INTRAVENOUS at 06:45

## 2020-01-01 RX ADMIN — MORPHINE SULFATE 2 MG: 4 INJECTION INTRAVENOUS at 11:20

## 2020-01-01 RX ADMIN — OMEPRAZOLE 20 MG: 20 CAPSULE, DELAYED RELEASE ORAL at 05:36

## 2020-01-01 RX ADMIN — SENNOSIDES AND DOCUSATE SODIUM 2 TABLET: 8.6; 5 TABLET ORAL at 18:08

## 2020-01-01 RX ADMIN — LOSARTAN POTASSIUM 12.5 MG: 50 TABLET, FILM COATED ORAL at 17:12

## 2020-01-01 RX ADMIN — NICOTINE 14 MG: 14 PATCH TRANSDERMAL at 05:36

## 2020-01-01 RX ADMIN — IOHEXOL 1000 ML: 350 INJECTION, SOLUTION INTRAVENOUS at 10:45

## 2020-01-01 RX ADMIN — NICOTINE 14 MG: 14 PATCH TRANSDERMAL at 01:49

## 2020-01-01 RX ADMIN — GUAIFENESIN 1200 MG: 600 TABLET, EXTENDED RELEASE ORAL at 18:08

## 2020-01-01 RX ADMIN — ACETAMINOPHEN 650 MG: 325 TABLET, FILM COATED ORAL at 22:55

## 2020-01-01 RX ADMIN — METHYLPREDNISOLONE SODIUM SUCCINATE 40 MG: 40 INJECTION, POWDER, FOR SOLUTION INTRAMUSCULAR; INTRAVENOUS at 17:46

## 2020-01-01 RX ADMIN — TRAMADOL HYDROCHLORIDE 50 MG: 50 TABLET, FILM COATED ORAL at 06:45

## 2020-01-01 RX ADMIN — MAGNESIUM HYDROXIDE 30 ML: 400 SUSPENSION ORAL at 05:51

## 2020-01-01 RX ADMIN — TRAMADOL HYDROCHLORIDE 50 MG: 50 TABLET, FILM COATED ORAL at 11:03

## 2020-01-01 RX ADMIN — IPRATROPIUM BROMIDE AND ALBUTEROL SULFATE 3 ML: .5; 3 SOLUTION RESPIRATORY (INHALATION) at 18:33

## 2020-01-01 RX ADMIN — GUAIFENESIN 1200 MG: 600 TABLET, EXTENDED RELEASE ORAL at 06:38

## 2020-01-01 RX ADMIN — METHYLPREDNISOLONE SODIUM SUCCINATE 40 MG: 40 INJECTION, POWDER, FOR SOLUTION INTRAMUSCULAR; INTRAVENOUS at 05:51

## 2020-01-01 RX ADMIN — ENOXAPARIN SODIUM 40 MG: 100 INJECTION SUBCUTANEOUS at 18:08

## 2020-01-01 RX ADMIN — IPRATROPIUM BROMIDE AND ALBUTEROL SULFATE 3 ML: .5; 3 SOLUTION RESPIRATORY (INHALATION) at 22:13

## 2020-01-01 RX ADMIN — METHYLPREDNISOLONE SODIUM SUCCINATE 40 MG: 40 INJECTION, POWDER, FOR SOLUTION INTRAMUSCULAR; INTRAVENOUS at 17:08

## 2020-01-01 RX ADMIN — TRAMADOL HYDROCHLORIDE 50 MG: 50 TABLET, FILM COATED ORAL at 16:34

## 2020-01-01 RX ADMIN — DOXYCYCLINE 100 MG: 100 TABLET ORAL at 05:51

## 2020-01-01 RX ADMIN — OXYCODONE HYDROCHLORIDE 5 MG: 5 TABLET ORAL at 23:18

## 2020-01-01 RX ADMIN — METHYLPREDNISOLONE SODIUM SUCCINATE 40 MG: 40 INJECTION, POWDER, FOR SOLUTION INTRAMUSCULAR; INTRAVENOUS at 12:34

## 2020-01-01 RX ADMIN — POLYETHYLENE GLYCOL 3350 1 PACKET: 17 POWDER, FOR SOLUTION ORAL at 06:38

## 2020-01-01 RX ADMIN — IPRATROPIUM BROMIDE AND ALBUTEROL SULFATE 3 ML: .5; 3 SOLUTION RESPIRATORY (INHALATION) at 11:00

## 2020-01-01 RX ADMIN — METHYLPREDNISOLONE SODIUM SUCCINATE 40 MG: 40 INJECTION, POWDER, FOR SOLUTION INTRAMUSCULAR; INTRAVENOUS at 06:38

## 2020-01-01 RX ADMIN — KETOROLAC TROMETHAMINE 30 MG: 30 INJECTION, SOLUTION INTRAMUSCULAR at 22:21

## 2020-01-01 RX ADMIN — POTASSIUM CHLORIDE, DEXTROSE MONOHYDRATE AND SODIUM CHLORIDE: 224; 5; 450 INJECTION, SOLUTION INTRAVENOUS at 01:17

## 2020-01-01 RX ADMIN — METOPROLOL SUCCINATE 12.5 MG: 25 TABLET, EXTENDED RELEASE ORAL at 05:35

## 2020-01-01 RX ADMIN — TRAMADOL HYDROCHLORIDE 50 MG: 50 TABLET, FILM COATED ORAL at 22:55

## 2020-01-01 RX ADMIN — GUAIFENESIN 1200 MG: 600 TABLET, EXTENDED RELEASE ORAL at 17:46

## 2020-01-01 RX ADMIN — METHYLPREDNISOLONE SODIUM SUCCINATE 40 MG: 40 INJECTION, POWDER, FOR SOLUTION INTRAMUSCULAR; INTRAVENOUS at 23:37

## 2020-01-01 RX ADMIN — DOCUSATE SODIUM 50 MG AND SENNOSIDES 8.6 MG 2 TABLET: 8.6; 5 TABLET, FILM COATED ORAL at 06:45

## 2020-01-01 RX ADMIN — ACETAMINOPHEN 650 MG: 325 TABLET, FILM COATED ORAL at 16:58

## 2020-01-01 RX ADMIN — ASPIRIN 81 MG: 81 TABLET, COATED ORAL at 05:21

## 2020-01-01 RX ADMIN — OXYCODONE 10 MG: 5 TABLET ORAL at 16:04

## 2020-01-01 RX ADMIN — DOCUSATE SODIUM 50 MG AND SENNOSIDES 8.6 MG 2 TABLET: 8.6; 5 TABLET, FILM COATED ORAL at 16:58

## 2020-01-01 RX ADMIN — METHYLPREDNISOLONE SODIUM SUCCINATE 40 MG: 40 INJECTION, POWDER, FOR SOLUTION INTRAMUSCULAR; INTRAVENOUS at 11:13

## 2020-01-01 RX ADMIN — ACETAMINOPHEN 650 MG: 325 TABLET, FILM COATED ORAL at 05:51

## 2020-01-01 RX ADMIN — REGADENOSON 0.4 MG: 0.08 INJECTION, SOLUTION INTRAVENOUS at 13:52

## 2020-01-01 RX ADMIN — LOSARTAN POTASSIUM 12.5 MG: 50 TABLET, FILM COATED ORAL at 05:36

## 2020-01-01 RX ADMIN — POLYETHYLENE GLYCOL 3350 1 PACKET: 17 POWDER, FOR SOLUTION ORAL at 05:22

## 2020-01-01 RX ADMIN — ASPIRIN 81 MG: 81 TABLET, COATED ORAL at 17:11

## 2020-01-01 RX ADMIN — ONDANSETRON 4 MG: 2 INJECTION INTRAMUSCULAR; INTRAVENOUS at 09:40

## 2020-01-01 RX ADMIN — IPRATROPIUM BROMIDE AND ALBUTEROL SULFATE 3 ML: .5; 3 SOLUTION RESPIRATORY (INHALATION) at 18:34

## 2020-01-01 RX ADMIN — FUROSEMIDE 20 MG: 10 INJECTION, SOLUTION INTRAMUSCULAR; INTRAVENOUS at 12:05

## 2020-01-01 RX ADMIN — OXYCODONE 10 MG: 5 TABLET ORAL at 20:44

## 2020-01-01 RX ADMIN — KETOROLAC TROMETHAMINE 30 MG: 30 INJECTION, SOLUTION INTRAMUSCULAR at 01:38

## 2020-01-01 RX ADMIN — TRAMADOL HYDROCHLORIDE 50 MG: 50 TABLET, FILM COATED ORAL at 23:00

## 2020-01-01 RX ADMIN — KETOROLAC TROMETHAMINE 30 MG: 30 INJECTION, SOLUTION INTRAMUSCULAR at 23:36

## 2020-01-01 RX ADMIN — ASPIRIN 81 MG: 81 TABLET, COATED ORAL at 05:53

## 2020-01-01 RX ADMIN — METHYLPREDNISOLONE SODIUM SUCCINATE 40 MG: 40 INJECTION, POWDER, FOR SOLUTION INTRAMUSCULAR; INTRAVENOUS at 12:05

## 2020-01-01 RX ADMIN — ONDANSETRON 4 MG: 4 TABLET, ORALLY DISINTEGRATING ORAL at 06:03

## 2020-01-01 RX ADMIN — SENNOSIDES AND DOCUSATE SODIUM 2 TABLET: 8.6; 5 TABLET ORAL at 17:08

## 2020-01-01 RX ADMIN — OMEPRAZOLE 20 MG: 20 CAPSULE, DELAYED RELEASE ORAL at 05:52

## 2020-01-01 RX ADMIN — POTASSIUM CHLORIDE, DEXTROSE MONOHYDRATE AND SODIUM CHLORIDE: 224; 5; 450 INJECTION, SOLUTION INTRAVENOUS at 14:59

## 2020-01-01 RX ADMIN — NICOTINE 14 MG: 14 PATCH TRANSDERMAL at 05:50

## 2020-01-01 RX ADMIN — GUAIFENESIN 1200 MG: 600 TABLET, EXTENDED RELEASE ORAL at 05:35

## 2020-01-01 RX ADMIN — ACETAMINOPHEN 650 MG: 325 TABLET, FILM COATED ORAL at 06:38

## 2020-01-01 ASSESSMENT — ENCOUNTER SYMPTOMS
BACK PAIN: 1
NECK PAIN: 0
DEPRESSION: 0
CONSTIPATION: 1
BLOOD IN STOOL: 0
DOUBLE VISION: 0
WHEEZING: 0
FEVER: 0
HALLUCINATIONS: 0
BLOOD IN STOOL: 0
NEUROLOGICAL NEGATIVE: 1
NERVOUS/ANXIOUS: 0
COUGH: 0
ORTHOPNEA: 0
DIZZINESS: 0
ABDOMINAL PAIN: 0
DIARRHEA: 0
COUGH: 1
CONSTIPATION: 1
WEAKNESS: 0
SPUTUM PRODUCTION: 0
SENSORY CHANGE: 0
SPEECH CHANGE: 0
ABDOMINAL PAIN: 1
SPUTUM PRODUCTION: 0
CONSTIPATION: 1
PALPITATIONS: 0
SPEECH CHANGE: 0
SINUS PAIN: 0
FOCAL WEAKNESS: 0
INSOMNIA: 0
DIAPHORESIS: 0
DIZZINESS: 0
HEADACHES: 0
COUGH: 0
HEADACHES: 0
HEMOPTYSIS: 0
HEMOPTYSIS: 0
DEPRESSION: 0
CONSTIPATION: 0
DIARRHEA: 0
ABDOMINAL PAIN: 1
NAUSEA: 0
NAUSEA: 0
PND: 0
HEMOPTYSIS: 0
SHORTNESS OF BREATH: 1
DEPRESSION: 0
HEARTBURN: 0
VOMITING: 0
PALPITATIONS: 0
POLYDIPSIA: 0
DEPRESSION: 0
PALPITATIONS: 0
NUMBNESS: 0
INSOMNIA: 0
FOCAL WEAKNESS: 0
WHEEZING: 0
DIZZINESS: 0
NERVOUS/ANXIOUS: 0
BRUISES/BLEEDS EASILY: 0
EYES NEGATIVE: 1
LIGHT-HEADEDNESS: 0
ABDOMINAL DISTENTION: 0
INSOMNIA: 0
CARDIOVASCULAR NEGATIVE: 1
PHOTOPHOBIA: 0
ALLERGIC/IMMUNOLOGIC NEGATIVE: 1
NAUSEA: 0
SPEECH CHANGE: 0
ARTHRALGIAS: 0
WEAKNESS: 0
STRIDOR: 0
VOMITING: 0
CHILLS: 0
PSYCHIATRIC NEGATIVE: 1
COUGH: 1
FEVER: 0
CONSTITUTIONAL NEGATIVE: 1
SPUTUM PRODUCTION: 0
HEMATOLOGIC/LYMPHATIC NEGATIVE: 1
BLURRED VISION: 0
CLAUDICATION: 0
WHEEZING: 0
CONSTIPATION: 0
RESPIRATORY NEGATIVE: 1
DIARRHEA: 0
SHORTNESS OF BREATH: 1
DEPRESSION: 0
ENDOCRINE NEGATIVE: 1
APNEA: 0
FOCAL WEAKNESS: 0
FLANK PAIN: 0
ABDOMINAL PAIN: 0
EYE PAIN: 0
SHORTNESS OF BREATH: 0
VOMITING: 0
SHORTNESS OF BREATH: 0
FEVER: 0
WEAKNESS: 0
SHORTNESS OF BREATH: 0
DIARRHEA: 0
DIZZINESS: 0
NECK PAIN: 1
DIZZINESS: 0
SENSORY CHANGE: 0
GASTROINTESTINAL NEGATIVE: 1
DIARRHEA: 0
NERVOUS/ANXIOUS: 0
INSOMNIA: 0
SPEECH CHANGE: 0
HEADACHES: 0
SENSORY CHANGE: 0
SHORTNESS OF BREATH: 1
FALLS: 0
VOMITING: 0
COUGH: 0
SPUTUM PRODUCTION: 0
FOCAL WEAKNESS: 0
SENSORY CHANGE: 0
PND: 0
BACK PAIN: 0
ORTHOPNEA: 1
FEVER: 0
COUGH: 0
HEADACHES: 0
CHEST TIGHTNESS: 0
NAUSEA: 0
TREMORS: 0
WEAKNESS: 0
ABDOMINAL PAIN: 1
HEMOPTYSIS: 0
PALPITATIONS: 0
TINGLING: 0
MYALGIAS: 0
WEAKNESS: 0
SHORTNESS OF BREATH: 1
WHEEZING: 0
HEADACHES: 0
CHOKING: 0
NAUSEA: 0
NERVOUS/ANXIOUS: 0
PALPITATIONS: 0
WHEEZING: 0
SORE THROAT: 0
SPUTUM PRODUCTION: 0
HEMOPTYSIS: 0
WHEEZING: 0
VOMITING: 0
STRIDOR: 0
FEVER: 0

## 2020-01-01 ASSESSMENT — LIFESTYLE VARIABLES
HOW MANY TIMES IN THE PAST YEAR HAVE YOU HAD 5 OR MORE DRINKS IN A DAY: 0
AVERAGE NUMBER OF DAYS PER WEEK YOU HAVE A DRINK CONTAINING ALCOHOL: 0
TOTAL SCORE: 0
DOES PATIENT WANT TO STOP DRINKING: NO
HAVE YOU EVER FELT YOU SHOULD CUT DOWN ON YOUR DRINKING: NO
TOTAL SCORE: 0
CONSUMPTION TOTAL: NEGATIVE
EVER FELT BAD OR GUILTY ABOUT YOUR DRINKING: NO
TOTAL SCORE: 0
SUBSTANCE_ABUSE: 0
HAVE PEOPLE ANNOYED YOU BY CRITICIZING YOUR DRINKING: NO
EVER_SMOKED: YES
EVER HAD A DRINK FIRST THING IN THE MORNING TO STEADY YOUR NERVES TO GET RID OF A HANGOVER: NO
ALCOHOL_USE: YES
ON A TYPICAL DAY WHEN YOU DRINK ALCOHOL HOW MANY DRINKS DO YOU HAVE: 0

## 2020-01-01 ASSESSMENT — COGNITIVE AND FUNCTIONAL STATUS - GENERAL
WALKING IN HOSPITAL ROOM: A LITTLE
DAILY ACTIVITIY SCORE: 18
CLIMB 3 TO 5 STEPS WITH RAILING: A LITTLE
STANDING UP FROM CHAIR USING ARMS: A LITTLE
TOILETING: A LITTLE
MOVING FROM LYING ON BACK TO SITTING ON SIDE OF FLAT BED: A LITTLE
DRESSING REGULAR UPPER BODY CLOTHING: A LITTLE
HELP NEEDED FOR BATHING: A LITTLE
MOBILITY SCORE: 18
MOVING TO AND FROM BED TO CHAIR: A LITTLE
SUGGESTED CMS G CODE MODIFIER MOBILITY: CK
SUGGESTED CMS G CODE MODIFIER DAILY ACTIVITY: CK
EATING MEALS: A LITTLE
DRESSING REGULAR LOWER BODY CLOTHING: A LITTLE
TURNING FROM BACK TO SIDE WHILE IN FLAT BAD: A LITTLE
PERSONAL GROOMING: A LITTLE

## 2020-01-01 ASSESSMENT — COPD QUESTIONNAIRES
DO YOU EVER COUGH UP ANY MUCUS OR PHLEGM?: YES, EVERY DAY
DURING THE PAST 4 WEEKS HOW MUCH DID YOU FEEL SHORT OF BREATH: MOST  OR ALL OF THE TIME
COPD SCREENING SCORE: 9
HAVE YOU SMOKED AT LEAST 100 CIGARETTES IN YOUR ENTIRE LIFE: YES

## 2020-01-01 ASSESSMENT — PATIENT HEALTH QUESTIONNAIRE - PHQ9
SUM OF ALL RESPONSES TO PHQ9 QUESTIONS 1 AND 2: 0
1. LITTLE INTEREST OR PLEASURE IN DOING THINGS: NOT AT ALL
2. FEELING DOWN, DEPRESSED, IRRITABLE, OR HOPELESS: NOT AT ALL

## 2020-05-18 NOTE — PROGRESS NOTES
Transfer Center:    Received IP to Direct Admit transfer request from [facility] @ [phone]  Sending Physician:  Dr. Trevon Caballero, Hospitalist and Dr. Hernandez, Surgery  Specialist consulted:  Dr. Ole Peoples, Surgery   Diagnosis:  Pneumonia and Constipation   Patient accepted by:  Dr. Dino Lemos     Patient coming via:  [facility]  ETA:  TBD room availability   Medical records from sending facility scanned into Media tab.  Nursing to notify Direct Admit On-Call hospitalist and Specialist when patient arrives.     Plan:  Transfer Center to assist if needed.

## 2020-05-18 NOTE — PROGRESS NOTES
60yo female admitted with PNA COPD exhacerbation   Treated with levaquin will complete course today  PNA improving, CT subacute/acute rib Fx  Increase distension no BM 2 weeks had CT abdomen with colon distension  Cannot rule out a transition point, surgeon in [place] recommending transfer for barium enema or colonoscopy they spoke with Dr Peoples who will see patient in consultation  Recent covid19 negative     H&P progress notes imaging scanned under media tab in epic.

## 2020-05-19 PROBLEM — S22.42XA CLOSED FRACTURE OF MULTIPLE RIBS OF LEFT SIDE: Status: ACTIVE | Noted: 2020-01-01

## 2020-05-19 PROBLEM — J96.21 ACUTE ON CHRONIC RESPIRATORY FAILURE WITH HYPOXIA (HCC): Status: ACTIVE | Noted: 2020-01-01

## 2020-05-19 PROBLEM — J96.01 ACUTE RESPIRATORY FAILURE WITH HYPOXIA (HCC): Status: ACTIVE | Noted: 2020-01-01

## 2020-05-19 PROBLEM — J44.1 CHRONIC OBSTRUCTIVE PULMONARY DISEASE WITH ACUTE EXACERBATION (HCC): Chronic | Status: ACTIVE | Noted: 2020-01-01

## 2020-05-19 PROBLEM — I42.9 CARDIOMYOPATHY (HCC): Status: ACTIVE | Noted: 2020-01-01

## 2020-05-19 PROBLEM — K59.01 SLOW TRANSIT CONSTIPATION: Status: ACTIVE | Noted: 2020-01-01

## 2020-05-19 PROBLEM — Z72.0 TOBACCO ABUSE: Status: ACTIVE | Noted: 2020-01-01

## 2020-05-19 PROBLEM — J18.9 PNEUMONIA: Status: ACTIVE | Noted: 2020-01-01

## 2020-05-19 PROBLEM — J90 PLEURAL EFFUSION: Status: ACTIVE | Noted: 2020-01-01

## 2020-05-19 PROBLEM — S22.42XA CLOSED FRACTURE OF MULTIPLE RIBS OF LEFT SIDE: Chronic | Status: ACTIVE | Noted: 2020-01-01

## 2020-05-19 PROBLEM — J44.1 CHRONIC OBSTRUCTIVE PULMONARY DISEASE WITH ACUTE EXACERBATION (HCC): Status: ACTIVE | Noted: 2020-01-01

## 2020-05-19 NOTE — DISCHARGE PLANNING
This RN SUDARSHAN spoke with Pt over the phone and explained what a POLST form is.    Pt said she wants to have a copy of the form and was given a copy. Per Pt she will read it and have the form filled out.

## 2020-05-19 NOTE — CONSULTS
5/19  ATS for Large Bowel Obstruction    HPI: 59y F here with constipation and several days without a bowel movement.  She is having distension and loss of appetite with this and despite administration of multiple laxatives has not had a good response yet or BM in almost a week.  She has never had a colonoscopy before and is a 1.5ppd smoker at baseline.  She denies any hx of abdominal surgery either.      PMHx: none    PSHx: none    Meds: see Med Rec, no anticoagulation    All: Poliovirus    FamHx: no colon/rectal cancers, no other pertinent family history    SocHx: No Tob/Drugs, occasional EtOH      ROS: negative except as above    Consitutional- above  HEENT- no visual changes, no sneezing or runny nose  Skin- no rashes or itching  Cardiovascular- no chest pain or palpatations  Respiratory- no SOB or cough  GI- above  - no dysurea  Neuro- no weakness or syncope  Musculoskeletal- no muscle or joint pain  Heme- no bleeding or bruising  Lymphatic- no enlarged nodes or previous splenectomy  Endocrine- No sweating or heat/cold intolerance  Allergy- No asthma or hives  Psychiatric- no depression or anxiety        Physical Exam:   AFVSS  A@O x3, NAD  NCAT, no scleral icterus  Neck nontender, no lymphadenopathy  Normal respiratory effort, no chest wall masses  RRR, 2+ pulses  Abdomen soft, no peritonitis, no masses  Extremities warm and well perfused  No skin rashes or lesions    Labs: WBC 11.9, Hct 43, Plt 324  Lytes wnl    Radiology: Abdominal Ultrasound-   Mildly above-average colonic stool volume       No radiographic evidence of small bowel obstruction. No abnormal colon dilatation       Left pelvis calcifications most likely represent large phleboliths. These are more lateral than typical for urolithiasis or leiomyomas.     Previous CT imaging in [place] revealed dilated colon with likely left sided transition point and distal decompression      A/P: 59y F here with large bowel obstruction, highly suspicious for  cancer given presentation.  Will start with a diagnostic enema and consider lower endoscopy pending findings. No urgent surgery needed at this time, will follow along with primary team.

## 2020-05-19 NOTE — PROGRESS NOTES
Assumed care of pt at 1900, report given.  A+O x 4, VSS, and on 3-5L O2 NC (baseline 3L).  MD to come to bedside for direct admit.  Pt having mild to moderate shortness of breath; respiratory on board and chest x-ray ordered.  Pt has moderate to severe abdominal pain; medicated per MAR.  Pt NPO; denies N/V at this time.  +void  -BM (PTA 12+days)  Pt ambulating with a standby assist and tolerating well.   Pt updated on POC and all questions answered at this time.  Bed in lowest position, call light within reach, and no current needs.

## 2020-05-19 NOTE — ASSESSMENT & PLAN NOTE
-S/p thoracentesis 5/19/2020 with only 60cc of fluid removed. Fluid is transudative, suspect secondary to heart failure. Path & culture still pending.   -Echocardiogram with EF 30% & grade III diastolic dysfunction. Cardiology following.

## 2020-05-19 NOTE — PROGRESS NOTES
"Hospitalist on call paged. Page sent to Angela.   Pt arrived to [id number].   /89   Pulse (!) 118   Temp 37.4 °C (99.4 °F) (Temporal)   Ht 1.57 m (5' 1.81\")   Wt 45.9 kg (101 lb 3.1 oz)   SpO2 93%   BMI 18.62 kg/m²   Moderate SOB, use of accessory muscle, pt sitting edge of bed, calm.   Med rec unable to complete at this time d/t pt unable to remember.   "

## 2020-05-19 NOTE — ASSESSMENT & PLAN NOTE
-Continue IV solumedrol for now. Weaned to q12 hours today.   -Continue aggressive RT therapy.   -Continue mucinex.

## 2020-05-19 NOTE — H&P
Hospital Medicine History & Physical Note    Date of Service  5/18/2020    Primary Care Physician  No primary care provider on file.    Code Status  Full Code    Chief Complaint  Dyspnea and constipation     History of Presenting Illness  59 y.o. female who presented 5/18/2020 with past medical history of COPD, who presented to the hospital for constipation for 12 days.  Abdominal x-ray shows a high fecal impaction impaction and rectum is empty.  Patient states that she is been short of breath for more than 2 weeks.  Associated wheezing and a dry cough.  Patient has no recent sick exposures, does not complain of fever.  She currently not have abdominal pain, no complaints of nausea or vomiting.   Chest x-ray interpreted by me found increased intravascular congestion, left-sided pleural effusion  EKG interpreted by me found normal sinus rhythm with LVH and repolarization changes, T wave inversions in lateral leads      I reviewed the records from [facility] found admitted on 5/14  She right the hospital with blood pressure 155/81, placed on 4 L oxygen, pulse 77, respiratory 18  WBC 12, hemoglobin 13, hematocrit 41, platelet 297, BNP 1051, sodium 138, potassium 3, chloride 101, CO2 25, anion gap 15, glucose 89, BUN 14, creatinine 0.51, calcium 9.2,  COVID was negative  Chest x-ray found borderline cardiomegaly with a small to moderate left pleural effusion  CT of the abdomen and pelvis found a liver is unremarkable, spleen unremarkable, pancreas unremarkable, kidneys unremarkable there is a large amount of stool noted in the colon.  There is a metallic streak artifact in the left posterior with thesis limits evaluation of the pelvis.  There is no free intraperitoneal air or free fluid  CT scan of the chest 5/17: Found small left pleural effusion there is atelectatic changes at the left lung base with air bronchograms there is an acute and subacute fractures of the left sixth and ninth rib.  Patient was  treated with IV Solu-Medrol DuoNeb inhalers and IV Levaquin    Review of Systems  Review of Systems   Constitutional: Negative for chills, diaphoresis, fever and malaise/fatigue.   HENT: Negative for congestion, ear discharge, ear pain, hearing loss, nosebleeds, sinus pain, sore throat and tinnitus.    Eyes: Negative for blurred vision, double vision, photophobia and pain.   Respiratory: Positive for cough and shortness of breath. Negative for hemoptysis, sputum production, wheezing and stridor.    Cardiovascular: Negative for chest pain, palpitations, orthopnea, claudication, leg swelling and PND.   Gastrointestinal: Positive for abdominal pain and constipation. Negative for blood in stool, diarrhea, heartburn, melena, nausea and vomiting.   Genitourinary: Negative for dysuria, flank pain, frequency, hematuria and urgency.   Musculoskeletal: Negative for back pain, falls, joint pain, myalgias and neck pain.   Skin: Negative for itching and rash.   Neurological: Negative for dizziness, tingling, tremors, weakness and headaches.   Endo/Heme/Allergies: Negative for environmental allergies and polydipsia. Does not bruise/bleed easily.   Psychiatric/Behavioral: Negative for depression, hallucinations, substance abuse and suicidal ideas.       Past Medical History  Medical history is reviewed and not pertinent    Surgical History  Surgical history is reviewed and not pertinent    Family History  Family history is reviewed and not pertinent    Social History   reports that she has been smoking cigarettes. She has never used smokeless tobacco.    Allergies  Allergies   Allergen Reactions   • Poliovirus Vaccine Inactivated Anaphylaxis   • Poliovirus Vaccine Live Oral Trivalent Anaphylaxis       Medications  None       Physical Exam  Temp:  [37.2 °C (99 °F)-37.4 °C (99.4 °F)] 37.2 °C (99 °F)  Pulse:  [] 89  Resp:  [18-19] 19  BP: (128-153)/(89-90) 153/90  SpO2:  [93 %-98 %] 95 %    Physical Exam  Vitals signs and  nursing note reviewed.   Constitutional:       General: She is in acute distress.      Appearance: Normal appearance. She is not ill-appearing, toxic-appearing or diaphoretic.   HENT:      Head: Normocephalic and atraumatic.      Nose: No congestion or rhinorrhea.      Mouth/Throat:      Pharynx: No oropharyngeal exudate or posterior oropharyngeal erythema.   Eyes:      General: No scleral icterus.  Neck:      Musculoskeletal: No neck rigidity or muscular tenderness.      Vascular: No carotid bruit.   Cardiovascular:      Rate and Rhythm: Regular rhythm. Tachycardia present.      Pulses: Normal pulses.      Heart sounds: Normal heart sounds. No murmur. No friction rub. No gallop.    Pulmonary:      Effort: Respiratory distress present.      Breath sounds: No stridor. Wheezing present. No rhonchi.   Abdominal:      General: Abdomen is flat. There is distension.      Palpations: There is no mass.      Tenderness: There is no abdominal tenderness. There is no left CVA tenderness, guarding or rebound.      Hernia: No hernia is present.   Musculoskeletal: Normal range of motion.         General: No swelling.      Right lower leg: No edema.      Left lower leg: No edema.   Lymphadenopathy:      Cervical: No cervical adenopathy.   Skin:     General: Skin is warm and dry.      Capillary Refill: Capillary refill takes more than 3 seconds.      Coloration: Skin is not jaundiced or pale.      Findings: No bruising or erythema.   Neurological:      Mental Status: She is alert.         Laboratory:  Recent Labs     05/18/20 1949   WBC 11.9*   RBC 4.35   HEMOGLOBIN 13.9   HEMATOCRIT 42.5   MCV 97.7   MCH 32.0   MCHC 32.7*   RDW 47.5   PLATELETCT 324   MPV 9.9     Recent Labs     05/18/20 1949   SODIUM 137   POTASSIUM 4.4   CHLORIDE 96   CO2 29   GLUCOSE 85   BUN 16   CREATININE 0.42*   CALCIUM 9.1     Recent Labs     05/18/20 1949   ALTSGPT 19   ASTSGOT 14   ALKPHOSPHAT 80   TBILIRUBIN 0.6   GLUCOSE 85         No results for  input(s): NTPROBNP in the last 72 hours.      Recent Labs     05/18/20 1949 05/18/20  2314   TROPONINT 23* 27*       Imaging:  [id number] VIEW   Final Result      Mildly above-average colonic stool volume      No radiographic evidence of small bowel obstruction. No abnormal colon dilatation      Left pelvis calcifications most likely represent large phleboliths. These are more lateral than typical for urolithiasis or leiomyomas.      DX-CHEST-PORTABLE (1 VIEW)   Final Result      Moderate left pleural effusion with basilar consolidation or atelectasis      Interstitial edema      Hyperinflation, cardiac silhouette enlargement      DX-BARIUM ENEMA    (Results Pending)   EC-ECHOCARDIOGRAM COMPLETE W/ CONT    (Results Pending)   IR-THORACENTESIS PUNCTURE LEFT    (Results Pending)         Assessment/Plan:    * Chronic obstructive pulmonary disease with acute exacerbation (HCC)  Assessment & Plan  IV Solu-Medrol 40 mg every 4 6  On DuoNeb inhalers  Decongestions ordered  I will avoid inhaled steroid in setting of pneumonia    Slow transit constipation  Assessment & Plan  She had 12 days of constipation  She was given 1 dose of Relistor without effect at the outlying facility  Dr. Peoples was consulted and recommended Gastrografin enema    Closed fracture of multiple ribs of left side  Assessment & Plan  Pain control  Lidocaine patch ordered  Aggressive ISS and ambulation    Pleural effusion  Assessment & Plan  Left-sided pleural effusion  History of smoking high risk for malignancy  Order thoracentesis  Check cardiac echo    Acute respiratory failure with hypoxia (HCC)  Assessment & Plan  On 3 L of O2 above baseline  Continue wean off as necessary    Pneumonia  Assessment & Plan  Found to have left sided infiltrate  Bronchial breath sounds and egophony on the left lung exam   Started on Cap antibiotics including (Doxycycline and Ceftriaxone)   Follow up sputum cultures  Respiratory care protocol   Placed on o2 therapy        Tobacco abuse  Assessment & Plan  Tobacco cessation education provided for more than 5 minutes.  We discussed the risks of smoking including increased risk of heart disease, stroke, cancer and COPD. We discussed the benefits of quitting smoking. We discussed options of nicotine patch, wellbutrin and chantix.  The patient has the intention to quit smoking. Nicotine replacement protocol will be provided to the patient.  I explained to the patient that she is in a have repeated hospitalizations for COPD if she does not quit smoking    I spent a total of 35 minutes of non face to face time performing additional research, reviewing medical records from transferring facility, discussing plan of care with other healthcare providers. Start time: 6 45 pm. End time: 7:20 pm

## 2020-05-19 NOTE — PROCEDURES
Ultrasound Guided Thoracentesis  Left Chest  Diagnostic    Sonographer: Nuris Burnham  Radiologist: Dr. Hartman    Pt brought down and consented.    60cc of clear yellow fluid was removed from the left chest.  All fluid was sent to the lab.    Vitals remained stable.  Pt. Tolerated procedure well.    Portable chest xray was done before sending pt back upstairs

## 2020-05-19 NOTE — PROGRESS NOTES
Pt off unit to radiology for procedure. Tank >3/4 full. Pt on 4L. Saturating >90%, tolerating. Pain controlled, previously medicated per MAR.

## 2020-05-19 NOTE — PROGRESS NOTES
2 RN skin check complete. Healing scabs noted on right back shoulder and right top of foot. Skin is generalized dry and flaky. All other skin intact over bony prominences.

## 2020-05-19 NOTE — DIETARY
"Nutrition services: Day 1 of admit.  Mae Paredes is a 59 y.o. female with admitting DX of pneumonia, constipation.    Consult received for unsure wt loss per admit screen (MST score of 2), BMI <19. Of note, admit screen negative for poor PO.  Spoke with pt at bedside. Pt appeared older than stated age and somewhat thin. Moderate fat loss and moderate muscle loss noted by slightly hollowed orbitals, slightly pronounced zygomatic arches and some loose facial skin. Pt does not know her UBW, and is unsure of recent wt loss.  Lower than usual appetite PTA, which I would suspect is related to 12 days of constipation.  Pt was in visible pain during time of interview, felt it was appropriate to discontinue interview.     Assessment:  Height: 157 cm (5' 1.81\")  Weight: 45.9 kg (101 lb 3.1 oz)  Body mass index is 18.62 kg/m²., BMI classification: normal  Diet/Intake: NPO    Evaluation:   1. Noted with abdominal distention and constipation.  2. CT showed dilated colon.  3. Surgery following for large bowel obstruction.  4. Pt to receive diagnostic enema with possible lower endoscopy.    Malnutrition Risk: Pt at risk for non severe (moderate) malnutrition in the context of acute injury, related to abdominal distention and severe constipation, as evidenced by physical markers for moderate fat and moderate muscle loss as noted above and <50% of estimated energy requirements for >5 days.    Recommendations/Plan:  1. Advance diet as tolerated per MD.    RD will continue to follow.          "

## 2020-05-19 NOTE — ASSESSMENT & PLAN NOTE
-On 3 LPM of O2 at baseline.  -Secondary to pleural effusion (now resolved) & COPD exacerbation.   -Continue aggressive RT therapy.   -Continue I.S. as able.   -Continue mucinex.   -Continue oxygen therapy as required.

## 2020-05-19 NOTE — PROGRESS NOTES
"A&Ox4. Pt anxious and intermittently restless.   /87   Pulse 77   Temp 37.1 °C (98.8 °F) (Temporal)   Resp 18   Ht 1.57 m (5' 1.81\")   Wt 45.9 kg (101 lb 3.1 oz)   SpO2 98%   BMI 18.62 kg/m²   Pt states abd pain 7/10, previously medicated per MAR.  Neuro intact, denies numbness or tingling.   Saturating >90% on 4L. 3L baseline at home. Moderate SOB. + productive cough.   Abdomen hard, hypoactive bowel sounds, - flatus, LBM PTA (5/6 estimated).   NPO for procedures, denies n/v.   Pt voiding adequately.   Pt ambulates SBA. Fall precautions in place.   SCDs on.   Updated on plan of care. Safety education provided. Bed locked in low. Call light within reach. Hourly rounding in place.        "

## 2020-05-19 NOTE — ASSESSMENT & PLAN NOTE
-Pt refused lidoderm patch, stated the rib fractures are old and don't bother her.   -Continue I.S. as tolerated.

## 2020-05-19 NOTE — RESPIRATORY CARE
"  COPD EDUCATION by COPD CLINICAL EDUCATOR  (Phone: [phone])  5/19/2020 at 11:57 AM by Irais Ferrer, RRT     Patient was interviewed by Respiratory Education team for COPD program. Patient refused COPD program. Information packet about lung disease, its treatments and \"Stop Smoking\" information given to patient. She is a fair amount of abdominal pain. She declined further discussion. Our contact information was provided. She is an active daily smoker with known COPD .She has an Albuterol inhaler but wasn't sure of another one. Denied oxygen use.     "

## 2020-05-19 NOTE — ASSESSMENT & PLAN NOTE
-Was on IV ceftriaxone & PO doxy empirically. However, her symptoms are most likely not related to a pneumonia, her procal is negative, and her pleural fluid is transudative. Antibiotics therefore stopped on 5/20/2020.     -Continue aggressive RT therapy.   -Continue I.S. as able.   -Continue mucinex.   -Continue oxygen therapy as required.

## 2020-05-19 NOTE — PROGRESS NOTES
Pt's troponin level resulted and pt still complaining of mild to moderate pain. This RN called hospitalist Dr. Miller and orders given for pain medication. No new orders received for elevated troponin level. Hourly rounding in place.

## 2020-05-19 NOTE — CARE PLAN
Problem: Nutritional:  Goal: Achieve adequate nutritional intake  Description: Patient will consume 50% of meals.  Outcome: NOT MET

## 2020-05-19 NOTE — CARE PLAN
Problem: Communication  Goal: The ability to communicate needs accurately and effectively will improve  Outcome: PROGRESSING AS EXPECTED   Education provided on use of call light. Pt demonstrates understanding by using call light appropriately.     Problem: Safety  Goal: Will remain free from falls  Outcome: PROGRESSING AS EXPECTED   Call light within reach, bed locked in low position, personal belongings within reach, treaded socks on, all needs met at this time. Armband placed.

## 2020-05-19 NOTE — ASSESSMENT & PLAN NOTE
-S/p multiple laxatives & 1 dose of relistor at outlying facility.   -Surgery consulted. Diagnostic enema showed no obstruction. Now having bowel movements & tolerating a diet which will be advanced as she continues to tolerate.

## 2020-05-19 NOTE — CONSULTS
"CARDIAC CONSULTATION    Requesting Provider: Dr. Brooks  Reason for consultation: new diagnosis of heart failure    History: Mae Paredes is a 59 y.o. female whose current medical problems include COPD, current tobacco use who initially presented to an outside hospital for recurrent pneumonia, found to have a large bowel obstruction and COPD exacerbation with left pleural effusion.      During this hospitalization she had a TTE which showed moderately reduced left ventricular systolic function, EF 30%, grade III diastolic dysfunction with mild LVH, mild MR, moderate AI, and a small pericardial effusion without evidence of hemodynamic compromise.    She denies any known cardiac history. At home she is short of breath with minimal exertion. She can do household chores but cannot walk around the block or go up a flight of stairs. She has never had lower extremity edema. She does not sleep on her back due to back pain. She denies chest pain or pressure with exertion.     She had 3 ED visits for \"pneumonia\" in the last month.     She is a current smoker, rare alcohol use. No illicit drugs.     No family history of cardiac disease or sudden cardiac death.     Review of Systems:      Constitutional: Denies fevers, chills, weight changes  Eyes: Denies changes in vision  Ears/Nose/Throat/Mouth: + nasal congestion   Cardiovascular: no chest pain, no palpitations, no orthopnea, no leg swelling  Respiratory: + shortness of breath , + cough  Gastrointestinal/Hepatic: + abdominal pain, no nausea, vomiting or GI bleeding   Genitourinary: Denies hematuria  Musculoskeletal/Rheum: Denies  joint pain, falls  Skin: Denies rash, open wound  Neurological: Denies headache, confusion, focal weakness/parasthesias  Psychiatric: denies mood disorder   Heme/Oncology/Lymph Nodes:  denies brusing or known bleeding disorder  All other systems were reviewed and are negative (AMA/CMS criteria)                PE:  /89   Pulse 72   Temp 37.6 °C " "(99.6 °F) (Temporal)   Resp 18   Ht 1.57 m (5' 1.81\")   Wt 45.9 kg (101 lb 3.1 oz)   SpO2 98%   BMI 18.62 kg/m²     General: Frail female, appears older than stated age. In no distress  Eyes: nl conjunctiva, no scleral icterus  Mouth: dry oral mucosa  Neck: JVP 5 cm H2O, no carotid bruits  Lungs: normal respiratory effort, rales in bases  Heart: RRR, quiet diastolic murmur murmurs, no rubs or gallops, no edema in bilateral lower extremities. No LV/RV heave on cardiac palpatation. 2+ bilateral radial pulses.  2+ bilateral dp pulses.   Abdomen: distended and tender with guarding   Extremities/MSK: Warm, well perfused, no clubbing, no cyanosis  Neurological: No focal sensory deficits, normal gait  Psychiatric: Appropriate affect, A/O x 3, intact judgement and insight  Skin: No rashes, warm extremities      History reviewed. No pertinent past medical history.  History reviewed. No pertinent surgical history.  Allergies   Allergen Reactions   • Poliovirus Vaccine Inactivated Anaphylaxis   • Poliovirus Vaccine Live Oral Trivalent Anaphylaxis         Current Facility-Administered Medications:   •  oxyCODONE immediate-release (ROXICODONE) tablet 5 mg, 5 mg, Oral, Once, Franck Miller M.D.  •  dextrose 5 % and 0.45 % NaCl with KCl 30 mEq infusion, , Intravenous, Continuous, Franck Miller M.D., Last Rate: 75 mL/hr at 05/19/20 0117  •  oxyCODONE immediate-release (ROXICODONE) tablet 5 mg, 5 mg, Oral, Q4HRS PRN, Franck Miller M.D., 5 mg at 05/19/20 1012  •  senna-docusate (PERICOLACE or SENOKOT S) 8.6-50 MG per tablet 2 Tab, 2 Tab, Oral, BID, 2 Tab at 05/19/20 0645 **AND** polyethylene glycol/lytes (MIRALAX) PACKET 1 Packet, 1 Packet, Oral, QDAY PRN **AND** magnesium hydroxide (MILK OF MAGNESIA) suspension 30 mL, 30 mL, Oral, QDAY PRN **AND** bisacodyl (DULCOLAX) suppository 10 mg, 10 mg, Rectal, QDAY PRN, Franck Miller M.D.  •  acetaminophen (TYLENOL) tablet 650 mg, 650 mg, Oral, Q6HRS PRN, Franck Miller M.D., 650 mg at " 05/19/20 0645  •  ondansetron (ZOFRAN) syringe/vial injection 4 mg, 4 mg, Intravenous, Q4HRS PRN, Franck Miller M.D.  •  ondansetron (ZOFRAN ODT) dispertab 4 mg, 4 mg, Oral, Q4HRS PRN, Franck Miller M.D.  •  promethazine (PHENERGAN) tablet 12.5-25 mg, 12.5-25 mg, Oral, Q4HRS PRN, Franck Miller M.D.  •  promethazine (PHENERGAN) suppository 12.5-25 mg, 12.5-25 mg, Rectal, Q4HRS PRN, Franck Miller M.D.  •  prochlorperazine (COMPAZINE) injection 5-10 mg, 5-10 mg, Intravenous, Q4HRS PRN, Franck Miller M.D.  •  Respiratory Therapy Consult, , Nebulization, Continuous RT, Franck Miller M.D.  •  ipratropium-albuterol (DUONEB) nebulizer solution, 3 mL, Nebulization, Q4HRS (RT), Franck Miller M.D., 3 mL at 05/19/20 1100  •  methylPREDNISolone (SOLU-MEDROL) 40 MG injection 40 mg, 40 mg, Intravenous, Q6HRS, Franck Miller M.D., 40 mg at 05/19/20 1113  •  guaiFENesin ER (MUCINEX) tablet 1,200 mg, 1,200 mg, Oral, BID, Franck Miller M.D., 1,200 mg at 05/19/20 0645  •  doxycycline monohydrate (ADOXA) tablet 100 mg, 100 mg, Oral, Q12HRS, Franck Miller M.D., 100 mg at 05/19/20 0645  •  cefTRIAXone (ROCEPHIN) 1 g in  mL IVPB, 1 g, Intravenous, Q24HRS, Franck Miller M.D., Stopped at 05/18/20 2325  •  omeprazole (PRILOSEC) capsule 20 mg, 20 mg, Oral, DAILY, Franck Miller M.D., 20 mg at 05/19/20 0645  •  tramadol (ULTRAM) 50 MG tablet 50 mg, 50 mg, Oral, Q6HRS PRN, Franck Miller M.D., 50 mg at 05/19/20 0645  •  hydrALAZINE (APRESOLINE) injection 20 mg, 20 mg, Intravenous, Q6HRS PRN, Franck Miller M.D.  Medications Prior to Admission   Medication Sig Dispense Refill Last Dose   • levalbuterol (XOPENEX) 0.63 MG/3ML Nebu Soln 0.63 mg by Nebulization route every four hours as needed for Shortness of Breath.      • amoxicillin-clavulanate (AUGMENTIN) 875-125 MG Tab Take 1 Tab by mouth 2 times a day. Take for 10 days      • tizanidine (ZANAFLEX) 4 MG Tab Take 1 mg by mouth 2 times a day.      • montelukast (SINGULAIR) 10 MG Tab Take 10 mg  by mouth every day.      • HYDROcodone/acetaminophen (NORCO)  MG Tab Take 1-2 Tabs by mouth every 6 hours as needed.      • omeprazole (PRILOSEC) 20 MG delayed-release capsule Take 20 mg by mouth every day.      • zolpidem (AMBIEN) 5 MG Tab Take 5 mg by mouth at bedtime as needed for Sleep.         Social History     Socioeconomic History   • Marital status: Not on file     Spouse name: Not on file   • Number of children: Not on file   • Years of education: Not on file   • Highest education level: Not on file   Occupational History   • Not on file   Social Needs   • Financial resource strain: Not on file   • Food insecurity     Worry: Not on file     Inability: Not on file   • Transportation needs     Medical: Not on file     Non-medical: Not on file   Tobacco Use   • Smoking status: Current Every Day Smoker     Types: Cigarettes   • Smokeless tobacco: Never Used   Substance and Sexual Activity   • Alcohol use: Not on file   • Drug use: Not on file   • Sexual activity: Not on file   Lifestyle   • Physical activity     Days per week: Not on file     Minutes per session: Not on file   • Stress: Not on file   Relationships   • Social connections     Talks on phone: Not on file     Gets together: Not on file     Attends Sikh service: Not on file     Active member of club or organization: Not on file     Attends meetings of clubs or organizations: Not on file     Relationship status: Not on file   • Intimate partner violence     Fear of current or ex partner: Not on file     Emotionally abused: Not on file     Physically abused: Not on file     Forced sexual activity: Not on file   Other Topics Concern   • Not on file   Social History Narrative   • Not on file       History reviewed. No pertinent family history.       Studies  Lab Results   Component Value Date/Time    CHOLSTRLTOT 189 05/19/2020 03:17 AM    LDL 93 05/19/2020 03:17 AM    HDL 58 05/19/2020 03:17 AM    TRIGLYCERIDE 190 (H) 05/19/2020 03:17 AM        Lab Results   Component Value Date/Time    SODIUM 137 2020 07:49 PM    POTASSIUM 4.4 2020 07:49 PM    CHLORIDE 96 2020 07:49 PM    CO2 29 2020 07:49 PM    GLUCOSE 85 2020 07:49 PM    BUN 16 2020 07:49 PM    CREATININE 0.42 (L) 2020 07:49 PM     Lab Results   Component Value Date/Time    ALKPHOSPHAT 80 2020 07:49 PM    ASTSGOT 14 2020 07:49 PM    ALTSGPT 19 2020 07:49 PM    TBILIRUBIN 0.6 2020 07:49 PM      No results found for: BNPBTYPENAT    For this encounter I directly reviewed:    EK20: sinus rhythm with LVH    TTE 20  No prior study is available for comparison.   Moderately reduced left ventricular systolic function.  Left ventricular ejection fraction is visually estimated to be 30%.  Mild mitral regurgitation.  Moderate aortic insufficiency.  Small pericardial effusion without evidence of hemodynamic compromise.  Pleural effusion noted.    Left Ventricle  Normal left ventricular chamber size. Mild concentric left ventricular   hypertrophy. Moderately reduced left ventricular systolic function.   Left ventricular ejection fraction is visually estimated to be 30%.   Hypokinetic septal walls. Grade III diastolic dysfunction (restrictive   pattern).     Right Ventricle  Normal right ventricular size. Normal right ventricular systolic   function.     Right Atrium  Dilated right atrium. Normal inferior vena cava size without   inspiratory collapse.     Left Atrium  Severely dilated left atrium. Left atrial volume index is 74  mL/sq m.     Mitral Valve  Structurally normal mitral valve. No mitral stenosis. Mild mitral   regurgitation.     Aortic Valve  Aortic sclerosis without stenosis. Moderate aortic insufficiency.   Pressure half time is 392  msec.     Tricuspid Valve  Structurally normal tricuspid valve. No tricuspid stenosis. Trace   tricuspid regurgitation. Right atrial pressure is estimated to be 8   mmHg. Unable to estimate  pulmonary artery pressure due to an inadequate tricuspid regurgitant jet.     Pulmonic Valve  Structurally normal pulmonic valve. No pulmonic stenosis. Trace   pulmonic insufficiency.     Pericardium  Small pericardial effusion without evidence of hemodynamic compromise. Pleural effusion noted.     Aorta  Normal aortic root for body surface area. Ascending aorta diameter is 1.8 cm.     Impressions:  #. New diagnosis of cardiomyopathy, undifferentiated  #. HFrEF, stage B, NYHA class I, without acute exacerbation  #. Pleural effusion s/p diagnostic thoracentesis  #. Large Bowel Obstruction  #. Tobacco use      Recommendations:  - On exam she is not volume overloaded, she appears to be well compensated which suggests a longer chronicity to her cardiomyopathy. She has risk factors for ischemic cardiomyopathy and I recommend an ischemic work-up this admission, however this may be done after her bowel obstruction is resolved.   - For now start aspirin 81mg and lipitor 40mg  - I will start her on small dose of losartan and metoprolol. Monitor her reaction to the beta blocker given her COPD, if she is requiring more nebulizer treatments this can be discontinued.   - She appears on the dry side so will hold on starting spironolactone  - We will continue to follow this patient    Case discussed with Dr. Castillo

## 2020-05-19 NOTE — PROGRESS NOTES
Pt back to unit. Saturating >90% on 3L. No concerns at this time. Thoracentesis site with band aid CDI.

## 2020-05-19 NOTE — PROGRESS NOTES
Hospital Medicine Daily Progress Note    Date of Service  5/19/2020    Chief Complaint  Constipation    Hospital Course   Ms. Paredes is a 59-year-old female who was air flighted from a hospital in [place], [place] for constipation and pneumonia.  He has a past medical history of oxygen dependent COPD.  She reportedly had not had a bowel movement in over 2 weeks.  Abdominal x-ray revealed a high fecal impaction and empty rectum.  She is also been notably short of breath x2 weeks.  A chest x-ray was done and showed increased intravascular congestion and a left-sided pleural effusion.  A CT of the abdomen was also done and noted a large amount of stool within the colon but no intraperitoneal air or free fluid.  A CT scan was also done of the chest on 5/17/2020 and found a small left pleural effusion with atelectatic changes at the left lung base with an acute and subacute fracture of the left sixth and ninth rib.  Surgery was consulted for possible large bowel obstruction and the patient was admitted to the hospital for further evaluation and treatment.      Interval Problem Update  In pain, feeling miserable, though breathing a little better after her treatment. Abdomen is tender, bowel sounds very hypoactive throughout. Still hasn't had a bowel movement. Echo & thoracentesis scheduled for today.     No CBC or BMP done today.  A1c 5.7%.  Cholesterol panel done and showed only mild elevation in her triglyceride level but was otherwise normal.  BNP ordered for tomorrow.    S/p thoracentesis today with 60 cc off.    T-max 99°F overnight, HR 70s-100, SBP 130s-150s, O2 saturations within the normal range on 3-4 L/min of oxygen via nasal cannula.    Consultants/Specialty  General surgery  Cardiology    Code Status  Full code    Disposition  Clinical for now    Review of Systems  Review of Systems   Constitutional: Negative for fever and malaise/fatigue.   Respiratory: Positive for shortness of breath. Negative for cough,  hemoptysis, sputum production and wheezing.    Cardiovascular: Positive for orthopnea. Negative for chest pain, palpitations, leg swelling and PND.   Gastrointestinal: Positive for abdominal pain and constipation. Negative for diarrhea, nausea and vomiting.   Genitourinary: Negative for dysuria, frequency and urgency.   Neurological: Negative for dizziness, sensory change, speech change, focal weakness, weakness and headaches.   Psychiatric/Behavioral: Negative for depression. The patient is not nervous/anxious and does not have insomnia.    All other systems reviewed and are negative.     Physical Exam  Temp:  [36.8 °C (98.2 °F)-37.6 °C (99.7 °F)] 36.8 °C (98.2 °F)  Pulse:  [] 86  Resp:  [16-19] 17  BP: (128-168)/(78-90) 155/90  SpO2:  [93 %-100 %] 96 %    Physical Exam  Vitals signs and nursing note reviewed.   Constitutional:       General: She is awake.      Appearance: She is well-developed. She is ill-appearing (acute & chronically ill-appearing).   HENT:      Head: Normocephalic and atraumatic.      Mouth/Throat:      Lips: Pink.      Mouth: Mucous membranes are moist.   Eyes:      Conjunctiva/sclera: Conjunctivae normal.      Pupils: Pupils are equal, round, and reactive to light.      Comments: Dysconjugate gaze   Neck:      Musculoskeletal: Normal range of motion and neck supple.   Cardiovascular:      Rate and Rhythm: Regular rhythm. Bradycardia present.      Pulses: Normal pulses.      Heart sounds: Murmur present.   Pulmonary:      Effort: Pulmonary effort is normal. Tachypnea present. No accessory muscle usage.      Breath sounds: Decreased air movement present. Examination of the right-upper field reveals decreased breath sounds. Examination of the left-upper field reveals decreased breath sounds. Examination of the right-middle field reveals decreased breath sounds. Examination of the right-lower field reveals decreased breath sounds. Examination of the left-lower field reveals decreased breath  sounds. Decreased breath sounds present.   Abdominal:      General: Abdomen is protuberant. Bowel sounds are decreased. There is distension. There is no abdominal bruit.      Tenderness: There is generalized abdominal tenderness.      Comments: Abdomen semi-firm   Musculoskeletal:      Right lower leg: No edema.      Left lower leg: No edema.   Skin:     General: Skin is warm and dry.   Neurological:      General: No focal deficit present.      Mental Status: She is alert and oriented to person, place, and time.      GCS: GCS eye subscore is 4. GCS verbal subscore is 5. GCS motor subscore is 6.   Psychiatric:         Attention and Perception: Attention and perception normal.         Mood and Affect: Mood is anxious. Affect is angry.         Speech: Speech normal.         Behavior: Behavior is agitated.         Thought Content: Thought content normal.         Cognition and Memory: Cognition normal.         Judgment: Judgment normal.     Fluids    Intake/Output Summary (Last 24 hours) at 5/19/2020 1802  Last data filed at 5/19/2020 1600  Gross per 24 hour   Intake 1233.75 ml   Output 900 ml   Net 333.75 ml     Laboratory  Recent Labs     05/18/20 1949   WBC 11.9*   RBC 4.35   HEMOGLOBIN 13.9   HEMATOCRIT 42.5   MCV 97.7   MCH 32.0   MCHC 32.7*   RDW 47.5   PLATELETCT 324   MPV 9.9     Recent Labs     05/18/20 1949   SODIUM 137   POTASSIUM 4.4   CHLORIDE 96   CO2 29   GLUCOSE 85   BUN 16   CREATININE 0.42*   CALCIUM 9.1     Recent Labs     05/19/20  0846   INR 1.04     Recent Labs     05/19/20  0317   TRIGLYCERIDE 190*   HDL 58   LDL 93     Imaging  US-THORACENTESIS PUNCTURE LEFT   Final Result      1. Ultrasound guided left sided diagnostic thoracentesis.      2. 60 mL of fluid withdrawn.      DX-CHEST-PORTABLE (1 VIEW)   Final Result      No pneumothorax following diagnostic left thoracentesis.      EC-ECHOCARDIOGRAM COMPLETE W/O CONT   Final Result      [id number] VIEW   Final Result      Mildly above-average  colonic stool volume      No radiographic evidence of small bowel obstruction. No abnormal colon dilatation      Left pelvis calcifications most likely represent large phleboliths. These are more lateral than typical for urolithiasis or leiomyomas.      DX-CHEST-PORTABLE (1 VIEW)   Final Result      Moderate left pleural effusion with basilar consolidation or atelectasis      Interstitial edema      Hyperinflation, cardiac silhouette enlargement      DX-BARIUM ENEMA    (Results Pending)      Assessment/Plan  * Chronic obstructive pulmonary disease with acute exacerbation (HCC)- (present on admission)  Assessment & Plan  -Continue IV solumedrol for now.   -Continue aggressive RT therapy.   -Continue mucinex.     Acute on chronic respiratory failure with hypoxia (HCC)- (present on admission)  Assessment & Plan  -Reportedly on 3 LPM of O2 at baseline?  -Secondary to pneumonia, pleural effusion, & COPD exacerbation.   --Continue aggressive RT therapy.   -Continue I.S. as able.   -Continue mucinex.   -Continue oxygen therapy as required.     Slow transit constipation- (present on admission)  Assessment & Plan  -? Large bowel obstruction  -S/p multiple laxatives & 1 dose of relistor at outlying facility.   -Surgery consulted, planning for diagnostic enema with consideration for lower endoscopy.     Pneumonia- (present on admission)  Assessment & Plan  -Found to have left sided infiltrate.  -Continue IV ceftriaxone & PO doxycycline for now.   -Continue aggressive RT therapy.   -Continue I.S. as able.   -Continue mucinex.   -Continue oxygen therapy as required.     Cardiomyopathy (HCC)- (present on admission)  Assessment & Plan  -Echo showed mixed systolic/diastolic dysfunction. EF 30%.   -Cardiology consulted, started her on ASA, statin, losartan, & metoprolol. Planning for ischemic workup after large bowel obstruction resolved.     Pleural effusion, left- (present on admission)  Assessment & Plan  -High risk for  malignancy given her smoking history.   -S/p thoracentesis today with only 60cc of fluid removed. Fluid analysis pending.   -Echocardiogram with EF 30% & grade III diastolic dysfunction. Cardiology consulted.    Tobacco abuse- (present on admission)  Assessment & Plan  -Nicotine replacement protocol ordered.   -Continue to encourage cessation.     Closed fracture of multiple ribs of left side, subacute- (present on admission)  Assessment & Plan  -Pt refused lidoderm patch, stated the rib fractures are old and don't bother her.   -Continue I.S. as tolerated.      VTE prophylaxis: Currently holding chemoprophylaxis pending surgical evaluation      Electronically signed by:  Susan Mathias, MSN, RN, APRN, ACNPC-AG, CCRN  Nurse Practitioner, [facility]  Work # [phone]    5/19/2020    6:02 PM

## 2020-05-19 NOTE — PROGRESS NOTES
Pt back to unit unable to perform barium swallow at this time due to increased work of breathing and oxygen demand while lying flat.   Pt sitting edge of bed on 4L saturating at 94%. Moderate WOB, use of accessory muscles, and pt restless. Respiratory called for treatment.

## 2020-05-20 PROBLEM — E87.1 ACUTE HYPONATREMIA: Status: ACTIVE | Noted: 2020-01-01

## 2020-05-20 NOTE — ASSESSMENT & PLAN NOTE
-Echo showed mixed systolic/diastolic dysfunction. EF 30%.   -Cardiology following, started her on ASA, statin, losartan, & metoprolol on 5/19/2020.   -Ischemic workup to ensue now that large bowel obstruction has resolved. Stress test scheduled today.

## 2020-05-20 NOTE — CARE PLAN
Problem: Knowledge Deficit  Goal: Knowledge of the prescribed therapeutic regimen will improve  Outcome: PROGRESSING AS EXPECTED   Pt given HF packet and educated on importance of monitoring symptoms of SOB, edema, weight gain, activity tolerance. Importance of low Na diet. Pt verbalized understanding     Problem: Nutritional:  Goal: Ability to attain and maintain optimal nutritional status will improve  Outcome: PROGRESSING SLOWER THAN EXPECTED     Pt currently NPO

## 2020-05-20 NOTE — PROGRESS NOTES
Bedside report received.  Assessment complete.  A&O x 4. Patient calls appropriately.  Patient ambulates with SBA assist.   Patient has 4/10 pain.  Thoracentesis site to L posterior chest with gauze  Pt medicated for Barium enema scheduled today at 10pm, as she did not tolerated study yesterday.  Pt complaints of nausea, medicated per MAR.   NPO sips with meds  + void, - BM (PTA 13 days).  Patient denies SOB.  Review plan with of care with patient. Call light and personal belongings with in reach. Hourly rounding in place. All needs met at this time.

## 2020-05-20 NOTE — PROGRESS NOTES
Hospital Medicine Daily Progress Note    Date of Service  5/20/2020    Chief Complaint  Constipation    Hospital Course   Ms. Paredes is a 59-year-old female who was air flighted from a hospital in [place], [place] for constipation and pneumonia.  He has a past medical history of oxygen dependent COPD.  She reportedly had not had a bowel movement in over 2 weeks.  Abdominal x-ray revealed a high fecal impaction and empty rectum.  She is also been notably short of breath x2 weeks.  A chest x-ray was done and showed increased intravascular congestion and a left-sided pleural effusion.  A CT of the abdomen was also done and noted a large amount of stool within the colon but no intraperitoneal air or free fluid.  A CT scan was also done of the chest on 5/17/2020 and found a small left pleural effusion with atelectatic changes at the left lung base with an acute and subacute fracture of the left sixth and ninth rib. She was subsequently admitted to the hospital for further evaluation and treatment where a thoracentesis was done on 5/19/2020 with 60 cc taken off. An echocardiogram was also done and showed an ejection fraction of 30% with grade III diastolic dysfunction. Cardiology was consulted and placed her on the appropriate medication regimen. They also recommended an ischemic workup after her acute issues resolve. Surgery was consulted for her possible large bowel obstruction.      Interval Problem Update  Less short of breath today. Nneka. Wants to focus on her stomach, not her heart. Explained to her that we will be doing both and that treatment for her heart failure for now is limited to taking meds only. Still no bowel movement.     CBC stable today. Interval drop in her sodium level to 130. BNP level elevated at 6109. Heart failure meds started yesterday by cardiology.     Diagnostic enema to be done today at the direction of general surgery.     T-max 99.2°F overnight, HR 70s-80s, SBP 120s-140s, O2 saturations  within the normal range on 4 L/min of oxygen via nasal cannula.    Consultants/Specialty  General surgery  Cardiology    Code Status  Full code    Disposition  Clinical for now.    Review of Systems  Review of Systems   Constitutional: Negative for fever and malaise/fatigue.   Respiratory: Positive for shortness of breath (improved overnight). Negative for cough, hemoptysis, sputum production and wheezing.    Cardiovascular: Negative for chest pain, palpitations and leg swelling.   Gastrointestinal: Positive for abdominal pain and constipation. Negative for diarrhea, nausea and vomiting.   Genitourinary: Negative for dysuria, frequency and urgency.   Neurological: Negative for dizziness, sensory change, speech change, focal weakness, weakness and headaches.   Psychiatric/Behavioral: Negative for depression. The patient is not nervous/anxious and does not have insomnia.    All other systems reviewed and are negative.     Physical Exam  Temp:  [36.8 °C (98.2 °F)-37.6 °C (99.6 °F)] 37.2 °C (98.9 °F)  Pulse:  [67-86] 78  Resp:  [16-18] 17  BP: (126-168)/() 159/104  SpO2:  [96 %-100 %] 98 %    Physical Exam  Vitals signs and nursing note reviewed.   Constitutional:       General: She is awake.      Appearance: She is well-developed. She is ill-appearing (acute & chronically ill-appearing).   HENT:      Head: Normocephalic and atraumatic.      Mouth/Throat:      Lips: Pink.      Mouth: Mucous membranes are moist.   Eyes:      Conjunctiva/sclera: Conjunctivae normal.      Pupils: Pupils are equal, round, and reactive to light.      Comments: Dysconjugate gaze   Neck:      Musculoskeletal: Normal range of motion and neck supple.   Cardiovascular:      Rate and Rhythm: Normal rate and regular rhythm.      Pulses: Normal pulses.      Heart sounds: Murmur present.   Pulmonary:      Effort: Pulmonary effort is normal. No accessory muscle usage.      Breath sounds: Decreased air movement present. Examination of the  right-upper field reveals decreased breath sounds. Examination of the left-upper field reveals decreased breath sounds. Examination of the right-middle field reveals decreased breath sounds. Examination of the right-lower field reveals decreased breath sounds. Examination of the left-lower field reveals decreased breath sounds. Decreased breath sounds present.   Abdominal:      General: Abdomen is protuberant. Bowel sounds are decreased. There is distension. There is no abdominal bruit.      Tenderness: There is generalized abdominal tenderness (more tender today).      Comments: Abdomen semi-firm   Musculoskeletal:      Right lower leg: No edema.      Left lower leg: No edema.   Skin:     General: Skin is warm and dry.   Neurological:      General: No focal deficit present.      Mental Status: She is alert and oriented to person, place, and time.      GCS: GCS eye subscore is 4. GCS verbal subscore is 5. GCS motor subscore is 6.   Psychiatric:         Attention and Perception: Attention and perception normal.         Mood and Affect: Affect is angry.         Speech: Speech normal.         Behavior: Behavior is agitated.         Thought Content: Thought content normal.         Cognition and Memory: Cognition normal.         Judgment: Judgment normal.     Fluids    Intake/Output Summary (Last 24 hours) at 5/20/2020 1244  Last data filed at 5/20/2020 0800  Gross per 24 hour   Intake 1530 ml   Output 400 ml   Net 1130 ml     Laboratory  Recent Labs     05/18/20 1949 05/20/20  0007   WBC 11.9* 10.0   RBC 4.35 3.75*   HEMOGLOBIN 13.9 12.0   HEMATOCRIT 42.5 37.0   MCV 97.7 98.7*   MCH 32.0 32.0   MCHC 32.7* 32.4*   RDW 47.5 45.7   PLATELETCT 324 245   MPV 9.9 10.6     Recent Labs     05/18/20 1949 05/20/20  0007   SODIUM 137 130*   POTASSIUM 4.4 4.4   CHLORIDE 96 95*   CO2 29 28   GLUCOSE 85 113*   BUN 16 14   CREATININE 0.42* 0.36*   CALCIUM 9.1 8.6     Recent Labs     05/19/20  0846   INR 1.04     Recent Labs      05/19/20  0317   TRIGLYCERIDE 190*   HDL 58   LDL 93     Imaging  US-THORACENTESIS PUNCTURE LEFT   Final Result      1. Ultrasound guided left sided diagnostic thoracentesis.      2. 60 mL of fluid withdrawn.      DX-CHEST-PORTABLE (1 VIEW)   Final Result      No pneumothorax following diagnostic left thoracentesis.      EC-ECHOCARDIOGRAM COMPLETE W/O CONT   Final Result      [id number] VIEW   Final Result      Mildly above-average colonic stool volume      No radiographic evidence of small bowel obstruction. No abnormal colon dilatation      Left pelvis calcifications most likely represent large phleboliths. These are more lateral than typical for urolithiasis or leiomyomas.      DX-CHEST-PORTABLE (1 VIEW)   Final Result      Moderate left pleural effusion with basilar consolidation or atelectasis      Interstitial edema      Hyperinflation, cardiac silhouette enlargement      DX-BARIUM ENEMA    (Results Pending)      Assessment/Plan  * Slow transit constipation- (present on admission)  Assessment & Plan  -? Large bowel obstruction  -S/p multiple laxatives & 1 dose of relistor at outlying facility.   -Surgery consulted, planning for diagnostic enema today, with consideration for lower endoscopy.     Acute on chronic respiratory failure with hypoxia (HCC)- (present on admission)  Assessment & Plan  -Reportedly on 3 LPM of O2 at baseline?  -Secondary to pneumonia, pleural effusion, & COPD exacerbation.   -Continue aggressive RT therapy.   -Continue I.S. as able.   -Continue mucinex.   -Continue oxygen therapy as required.     Pneumonia- (present on admission)  Assessment & Plan  -Was on IV ceftriaxone & PO doxy empirically. However, her symptoms are most likely not related to a pneumonia, her procal is negative, and her pleural fluid is transudative. Will therefore stop antibiotics.    -Continue aggressive RT therapy.   -Continue I.S. as able.   -Continue mucinex.   -Continue oxygen therapy as required.     Chronic  obstructive pulmonary disease with acute exacerbation (HCC)- (present on admission)  Assessment & Plan  -Continue IV solumedrol for now.   -Continue aggressive RT therapy.   -Continue mucinex.     Acute hyponatremia  Assessment & Plan  -Hypervolemic hyponatremia? Doesn't appear volume overloaded on exam though her BNP is elevated and there is no inspiratory collapse of the IVC on echo. Cardiology giving lasix today. Will stop IV fluids (D5 1/2NS w/ 30 KCL). Recheck BMP tomorrow.     Cardiomyopathy (HCC)- (present on admission)  Assessment & Plan  -Echo showed mixed systolic/diastolic dysfunction. EF 30%.   -Cardiology following, started her on ASA, statin, losartan, & metoprolol on 5/19/2020. Planning for ischemic workup after large bowel obstruction resolved.     Pleural effusion, left- (present on admission)  Assessment & Plan  -S/p thoracentesis 5/19/2020 with only 60cc of fluid removed. Fluid analysis shows likely transudative (based on protein ratio), suspect secondary to heart failure. Path & culture still pending.   -Echocardiogram with EF 30% & grade III diastolic dysfunction. Cardiology following.    Tobacco abuse- (present on admission)  Assessment & Plan  -Nicotine replacement protocol ordered.   -Continue to encourage cessation.     Closed fracture of multiple ribs of left side, subacute- (present on admission)  Assessment & Plan  -Pt refused lidoderm patch, stated the rib fractures are old and don't bother her.   -Continue I.S. as tolerated.      VTE prophylaxis: Currently holding chemoprophylaxis pending surgical plan. Will utilize SCDs for now.

## 2020-05-20 NOTE — HEART FAILURE PROGRAM
Patient was transferred from a facility in [place], [place] she came via air flight for constipation and PNA. She had had several bouts of pneumonia over the past few months and had not had a BM in 2 weeks.    She has been found to have an EF of 30% and is being started on IV diuresis today. Dr. Castillo has diagnosed new heart failure.    She had a thoracentesis with only 60 mL of fluid removed. She is considered high risk for malignancy given her smoking history. She has ongoing tobacco use. She also has been found to have closed fracture of multiple ribs on her left side.    Per Dr. Castillo, patient is to have an ischemic work up once her constipation is resolved.    Please see below for measures that must be addressed prior to discharge.     Daily Nurse: please begin to fill out the HF checklist (pink sheet in hard chart) and use it to guide your daily care.    Discharge Nurse: please ensure completeness of the HF cecklist (pink sheet in hard chart) and have it co-signed by the charge RN before the patient leaves the hospital.    Thank you, Winifred, Cardiovascular Nurse Navigator, RN, [facility] [phone]    HF Measures:  1. Documentation of LV systolic function (echo or cath) PTA, during this hospitalization, or plan to assess post discharge or reason for not assessing documented  2. Documentation of fluid intake and urine output every nursing shift  3. 2 hour post diuretic assessment documented 2 hours after diuretic given  4. HF Patient Education using the Living Well With Heart Failure Booklet and Symptom Tracker documented every nursing shift  5. Nutrition consult for diet education  6. Daily weights (one weight documented every 24 hours) on a standing scale unless standing is contraindicated in which case bed scale can be used - have patient write weight on symptom tracker  7. For LVEF less than or equal to 40%, ACE-I, ARNI or ARB prescribed at discharge   8. For LVEF less than or equal to 40%, an Evidence Based Beta Blocker  (bisoprolol, carvedilol, toprol xl) must be prescribed at discharge  9. For LVEF less than or equal to 35% aldosterone blockade prescribed at discharge  10. The combination of hydralazine and isosorbide dinitrate is recommended to reduce morbidity and mortality for patients self-described  Americans with NYHA class III-IV HFrEF (EF 40% or less), receiving optimal therapy with ACE inhibitors and beta blockers, unless contraindicated (Class I, KYLIE: A).  11. If a HF patient is diabetic or is newly diagnosed with DM: prescribed diabetes treatment at discharge in the form of glycemic control (diet or anti-hyperglycemic medication) or f/u appointment for diabetes management scheduled at discharge.  12. If a HF patient has diabetes: prescribed lipid lowering medication at discharge  13. Documented smoking cessation advice or counseling  14. If a HF patient has a-fib: anticoagulation is prescribed upon discharge or contraindication is documented  15. Screening for and administering immunizations as long as no contraindications: Pneumonia (regardless of age) and Influenza  16. Written discharge instructions include:  ? Daily weights  ? Record weight on tracker  ? Bring tracker to appointments  ? Call MD for weight gain of 3lb /day or 5lb/week  ? HF medication teaching  ? Low sodium diet  ? Follow up appointment within seven calendar days of d/c must include: date, time and location  ? Activity  ? Worsening symptoms    What if any of the above HF measures are contraindicated?  ? Request that the discharging provider document the medication/intervention and the contraindication specifically in a progress note  ? For example: “no CHF meds due to hypotension” is not enough. It needs to say: “No ACE-I, ARNI, ARB due to hypotension”; “No Beta Blockade due to bradycardia”…

## 2020-05-20 NOTE — CONSULTS
"Cardiology Consult Note:    Maikel Castillo M.D.  Date & Time note created:    5/19/2020   5:41 PM     Referring MD:  Dr. Brooks    Patient ID:   Name:             Mae Paredes     YOB: 1960  Age:                 59 y.o.  female   MRN:               [id number]                                                             Reason for Consult:      New onset CHF    History of Present Illness:    59-year-old female with a past medical history of COPD was admitted to hospital for pneumonia and bowel obstruction.  During the hospitalization she had a echo showing an EF of 30%.  She does have a 2-week history of \"\" pneumonia however her only symptoms were shortness of breath and cough she developed fevers chills or sweats.  She does have shortness of breath with exertion.  She is never had a work-up for ischemic issues.    Review of Systems:      Constitutional: Denies fevers, Denies weight changes  Eyes: Denies changes in vision, no eye pain  Ears/Nose/Throat/Mouth: Denies nasal congestion or sore throat   Cardiovascular: no chest pain, no palpitations   Respiratory: no shortness of breath , Denies cough  Gastrointestinal/Hepatic: Denies abdominal pain, nausea, vomiting, diarrhea, constipation or GI bleeding   Genitourinary: Denies dysuria or frequency  Musculoskeletal/Rheum: Denies  joint pain and swelling, no edema  Skin: Denies rash  Neurological: Denies headache, confusion, memory loss or focal weakness/parasthesias  Psychiatric: denies mood disorder   Endocrine: Sulema thyroid problems  Heme/Oncology/Lymph Nodes: Denies enlarged lymph nodes, denies brusing or known bleeding disorder  All other systems were reviewed and are negative (AMA/CMS criteria)                Past Medical History:   History reviewed. No pertinent past medical history.  Active Hospital Problems    Diagnosis   • Chronic obstructive pulmonary disease with acute exacerbation (HCC) [J44.1]     Priority: High   • Slow transit " constipation [K59.01]     Priority: High   • Tobacco abuse [Z72.0]     Priority: Low   • Pneumonia [J18.9]   • Acute respiratory failure with hypoxia (HCC) [J96.01]   • Pleural effusion [J90]   • Closed fracture of multiple ribs of left side [S22.42XA]       Past Surgical History:  History reviewed. No pertinent surgical history.    Hospital Medications:  Current Facility-Administered Medications   Medication Dose   • oxyCODONE immediate-release (ROXICODONE) tablet 5 mg  5 mg   • dextrose 5 % and 0.45 % NaCl with KCl 30 mEq infusion     • oxyCODONE immediate-release (ROXICODONE) tablet 5 mg  5 mg   • nicotine (NICODERM) 14 MG/24HR 14 mg  14 mg    And   • nicotine polacrilex (NICORETTE) 2 MG piece 2 mg  2 mg   • metoprolol SR (TOPROL XL) tablet 12.5 mg  12.5 mg   • losartan (COZAAR) tablet 12.5 mg  12.5 mg   • atorvastatin (LIPITOR) tablet 40 mg  40 mg   • aspirin EC (ECOTRIN) tablet 81 mg  81 mg   • senna-docusate (PERICOLACE or SENOKOT S) 8.6-50 MG per tablet 2 Tab  2 Tab    And   • polyethylene glycol/lytes (MIRALAX) PACKET 1 Packet  1 Packet    And   • magnesium hydroxide (MILK OF MAGNESIA) suspension 30 mL  30 mL    And   • bisacodyl (DULCOLAX) suppository 10 mg  10 mg   • acetaminophen (TYLENOL) tablet 650 mg  650 mg   • ondansetron (ZOFRAN) syringe/vial injection 4 mg  4 mg   • ondansetron (ZOFRAN ODT) dispertab 4 mg  4 mg   • promethazine (PHENERGAN) tablet 12.5-25 mg  12.5-25 mg   • promethazine (PHENERGAN) suppository 12.5-25 mg  12.5-25 mg   • prochlorperazine (COMPAZINE) injection 5-10 mg  5-10 mg   • Respiratory Therapy Consult     • ipratropium-albuterol (DUONEB) nebulizer solution  3 mL   • methylPREDNISolone (SOLU-MEDROL) 40 MG injection 40 mg  40 mg   • guaiFENesin ER (MUCINEX) tablet 1,200 mg  1,200 mg   • doxycycline monohydrate (ADOXA) tablet 100 mg  100 mg   • cefTRIAXone (ROCEPHIN) 1 g in  mL IVPB  1 g   • omeprazole (PRILOSEC) capsule 20 mg  20 mg   • tramadol (ULTRAM) 50 MG tablet 50 mg   50 mg   • hydrALAZINE (APRESOLINE) injection 20 mg  20 mg         Current Outpatient Medications:  Medications Prior to Admission   Medication Sig Dispense Refill Last Dose   • levalbuterol (XOPENEX) 0.63 MG/3ML Nebu Soln 0.63 mg by Nebulization route every four hours as needed for Shortness of Breath.      • amoxicillin-clavulanate (AUGMENTIN) 875-125 MG Tab Take 1 Tab by mouth 2 times a day. Take for 10 days      • tizanidine (ZANAFLEX) 4 MG Tab Take 1 mg by mouth 2 times a day.      • montelukast (SINGULAIR) 10 MG Tab Take 10 mg by mouth every day.      • HYDROcodone/acetaminophen (NORCO)  MG Tab Take 1-2 Tabs by mouth every 6 hours as needed.      • omeprazole (PRILOSEC) 20 MG delayed-release capsule Take 20 mg by mouth every day.      • zolpidem (AMBIEN) 5 MG Tab Take 5 mg by mouth at bedtime as needed for Sleep.          Medication Allergy:  Allergies   Allergen Reactions   • Poliovirus Vaccine Inactivated Anaphylaxis   • Poliovirus Vaccine Live Oral Trivalent Anaphylaxis       Family History:  History reviewed. No pertinent family history.    Social History:  Social History     Socioeconomic History   • Marital status: Not on file     Spouse name: Not on file   • Number of children: Not on file   • Years of education: Not on file   • Highest education level: Not on file   Occupational History   • Not on file   Social Needs   • Financial resource strain: Not on file   • Food insecurity     Worry: Not on file     Inability: Not on file   • Transportation needs     Medical: Not on file     Non-medical: Not on file   Tobacco Use   • Smoking status: Current Every Day Smoker     Types: Cigarettes   • Smokeless tobacco: Never Used   Substance and Sexual Activity   • Alcohol use: Not on file   • Drug use: Not on file   • Sexual activity: Not on file   Lifestyle   • Physical activity     Days per week: Not on file     Minutes per session: Not on file   • Stress: Not on file   Relationships   • Social connections  "    Talks on phone: Not on file     Gets together: Not on file     Attends Mandaen service: Not on file     Active member of club or organization: Not on file     Attends meetings of clubs or organizations: Not on file     Relationship status: Not on file   • Intimate partner violence     Fear of current or ex partner: Not on file     Emotionally abused: Not on file     Physically abused: Not on file     Forced sexual activity: Not on file   Other Topics Concern   • Not on file   Social History Narrative   • Not on file         Physical Exam:  Vitals/ General Appearance:   Weight/BMI: Body mass index is 18.62 kg/m².  /90   Pulse 86   Temp 36.8 °C (98.2 °F) (Oral)   Resp 17   Ht 1.57 m (5' 1.81\")   Wt 45.9 kg (101 lb 3.1 oz)   SpO2 96%   Vitals:    05/19/20 1345 05/19/20 1421 05/19/20 1447 05/19/20 1600   BP: 160/79 145/89  155/90   Pulse: 80 72 76 86   Resp:  18 16 17   Temp:  37.6 °C (99.6 °F)  36.8 °C (98.2 °F)   TempSrc:  Temporal  Oral   SpO2: 100% 98% 97% 96%   Weight:       Height:         Oxygen Therapy:  Pulse Oximetry: 96 %, O2 (LPM): 4, O2 Delivery Device: Nasal Cannula    Constitutional:   Well developed, Well nourished, No acute distress  HENMT:  Normocephalic, Atraumatic, Oropharynx moist mucous membranes, No oral exudates, Nose normal.  No thyromegaly.  Eyes:  EOMI, Conjunctiva normal, No discharge.  Neck:  Normal range of motion, No cervical tenderness,  no JVD.  Cardiovascular:  Normal heart rate, Normal rhythm, No murmurs, No rubs, No gallops.   Extremitites with intact distal pulses, no cyanosis, or edema.  Lungs:  Normal breath sounds, breath sounds clear to auscultation bilaterally,  no crackles, no wheezing.   Abdomen: Bowel sounds normal, Soft, No tenderness, No guarding, No rebound, No masses, No hepatosplenomegaly.  Skin: Warm, Dry, No erythema, No rash, no induration.  Neurologic: Alert & oriented x 3, No focal deficits noted, cranial nerves II through X are grossly " intact.  Psychiatric: Affect normal, Judgment normal, Mood normal.      MDM (Data Review):     Records reviewed and summarized in current documentation    Lab Data Review:  Recent Results (from the past 24 hour(s))   CBC with Differential    Collection Time: 05/18/20  7:49 PM   Result Value Ref Range    WBC 11.9 (H) 4.8 - 10.8 K/uL    RBC 4.35 4.20 - 5.40 M/uL    Hemoglobin 13.9 12.0 - 16.0 g/dL    Hematocrit 42.5 37.0 - 47.0 %    MCV 97.7 81.4 - 97.8 fL    MCH 32.0 27.0 - 33.0 pg    MCHC 32.7 (L) 33.6 - 35.0 g/dL    RDW 47.5 35.9 - 50.0 fL    Platelet Count 324 164 - 446 K/uL    MPV 9.9 9.0 - 12.9 fL    Neutrophils-Polys 76.90 (H) 44.00 - 72.00 %    Lymphocytes 8.70 (L) 22.00 - 41.00 %    Monocytes 13.60 (H) 0.00 - 13.40 %    Eosinophils 0.10 0.00 - 6.90 %    Basophils 0.20 0.00 - 1.80 %    Immature Granulocytes 0.50 0.00 - 0.90 %    Nucleated RBC 0.00 /100 WBC    Neutrophils (Absolute) 9.15 (H) 2.00 - 7.15 K/uL    Lymphs (Absolute) 1.03 1.00 - 4.80 K/uL    Monos (Absolute) 1.62 (H) 0.00 - 0.85 K/uL    Eos (Absolute) 0.01 0.00 - 0.51 K/uL    Baso (Absolute) 0.02 0.00 - 0.12 K/uL    Immature Granulocytes (abs) 0.06 0.00 - 0.11 K/uL    NRBC (Absolute) 0.00 K/uL   Comp Metabolic Panel (CMP)    Collection Time: 05/18/20  7:49 PM   Result Value Ref Range    Sodium 137 135 - 145 mmol/L    Potassium 4.4 3.6 - 5.5 mmol/L    Chloride 96 96 - 112 mmol/L    Co2 29 20 - 33 mmol/L    Anion Gap 12.0 7.0 - 16.0    Glucose 85 65 - 99 mg/dL    Bun 16 8 - 22 mg/dL    Creatinine 0.42 (L) 0.50 - 1.40 mg/dL    Calcium 9.1 8.5 - 10.5 mg/dL    AST(SGOT) 14 12 - 45 U/L    ALT(SGPT) 19 2 - 50 U/L    Alkaline Phosphatase 80 30 - 99 U/L    Total Bilirubin 0.6 0.1 - 1.5 mg/dL    Albumin 4.1 3.2 - 4.9 g/dL    Total Protein 6.1 6.0 - 8.2 g/dL    Globulin 2.0 1.9 - 3.5 g/dL    A-G Ratio 2.1 g/dL   Magnesium    Collection Time: 05/18/20  7:49 PM   Result Value Ref Range    Magnesium 2.4 1.5 - 2.5 mg/dL   PHOSPHORUS    Collection Time: 05/18/20   7:49 PM   Result Value Ref Range    Phosphorus 2.7 2.5 - 4.5 mg/dL   PROCALCITONIN    Collection Time: 20  7:49 PM   Result Value Ref Range    Procalcitonin <0.05 <0.25 ng/mL   LACTIC ACID    Collection Time: 20  7:49 PM   Result Value Ref Range    Lactic Acid 1.1 0.5 - 2.0 mmol/L   TROPONIN    Collection Time: 20  7:49 PM   Result Value Ref Range    Troponin T 23 (H) 6 - 19 ng/L   ESTIMATED GFR    Collection Time: 20  7:49 PM   Result Value Ref Range    GFR If African American >60 >60 mL/min/1.73 m 2    GFR If Non African American >60 >60 mL/min/1.73 m 2   LACTIC ACID    Collection Time: 20 11:14 PM   Result Value Ref Range    Lactic Acid 1.3 0.5 - 2.0 mmol/L   TROPONIN    Collection Time: 20 11:14 PM   Result Value Ref Range    Troponin T 27 (H) 6 - 19 ng/L   FLUID AMYLASE    Collection Time: 20  1:59 AM   Result Value Ref Range    Fluid Type Thoracentesis     Body Fluid Amylase 8 U/L   EKG    Collection Time: 20  2:02 AM   Result Value Ref Range    Report       [facility]    Test Date:  2020  Pt Name:    FLORENTIN BUCK                  Department: 141  MRN:        [id number]                      Room:       [facility]  Gender:     Female                       Technician: DINORA  :        1960                   Requested By:OSCAR NEWSOME  Order #:    [id number]                    Reading MD: Chemo Bhakta MD    Measurements  Intervals                                Axis  Rate:       73                           P:          69  AZ:         180                          QRS:        29  QRSD:       94                           T:          143  QT:         388  QTc:        428    Interpretive Statements  SINUS RHYTHM  PROBABLE LVH WITH SECONDARY REPOL ABNRM  No previous ECG available for comparison  Electronically Signed On 2020 11:07:38 PDT by Chemo Bhakta MD     Lipid Profile (Lipid Panel) Fasting    Collection Time: 20  3:17 AM   Result Value Ref  Range    Cholesterol,Tot 189 100 - 199 mg/dL    Triglycerides 190 (H) 0 - 149 mg/dL    HDL 58 >=40 mg/dL    LDL 93 <100 mg/dL   HEMOGLOBIN A1C    Collection Time: 05/19/20  3:17 AM   Result Value Ref Range    Glycohemoglobin 5.7 (H) 0.0 - 5.6 %    Est Avg Glucose 117 mg/dL   LACTIC ACID    Collection Time: 05/19/20  3:17 AM   Result Value Ref Range    Lactic Acid 0.9 0.5 - 2.0 mmol/L   LACTIC ACID    Collection Time: 05/19/20  6:54 AM   Result Value Ref Range    Lactic Acid 1.0 0.5 - 2.0 mmol/L   EC-ECHOCARDIOGRAM COMPLETE W/O CONT    Collection Time: 05/19/20  8:30 AM   Result Value Ref Range    Eject.Frac. MOD BP 48.65     Eject.Frac. MOD 4C 51.45     Eject.Frac. MOD 2C 49.42     Left Ventrical Ejection Fraction 30    Prothrombin Time    Collection Time: 05/19/20  8:46 AM   Result Value Ref Range    PT 13.8 12.0 - 14.6 sec    INR 1.04 0.87 - 1.13   FLUID PH    Collection Time: 05/19/20  1:30 PM   Result Value Ref Range    Fluid Type Thoracentesis     Ph Misc 7.20    FLUID TOTAL PROTEIN    Collection Time: 05/19/20  1:30 PM   Result Value Ref Range    Total Protein Fluid 2.2 g/dL   FLUID LDH    Collection Time: 05/19/20  1:30 PM   Result Value Ref Range    Body Fluid LDH 50 U/L   FLUID GLUCOSE    Collection Time: 05/19/20  1:30 PM   Result Value Ref Range    Glucose, Fluid 117 mg/dL   FLUID CELL COUNT    Collection Time: 05/19/20  1:30 PM   Result Value Ref Range    Fluid Type Thoracentesis     Color-Body Fluid Yellow     Character-Body Fluid Clear     Total RBC Count <2000 cells/uL    Total  cells/uL    Polys 36 %    Lymphs 21 %    Mononuclear Cells - Fluid 27 %    Mesothelial Cells - CSF 4 %    Fluid Histiocyte 10 %    Unidentified Cells - Fluid 2 %    Comments see below    Cytology    Collection Time: 05/19/20  2:10 PM   Result Value Ref Range    Cytology Reg See Path Report        Imaging/Procedures Review:    Chest Xray:  Reviewed    EKG:   Dated 5/19/2020 personally viewed but interpreted myself  showing sinus rhythm with possible LVH.    ECHO:  Date 5/19/2020 personally viewed interpreted myself showing mild MR mild to moderate AI and an EF of 30%.    MDM (Assessment and Plan):     Active Hospital Problems    Diagnosis   • Chronic obstructive pulmonary disease with acute exacerbation (HCC) [J44.1]     Priority: High   • Slow transit constipation [K59.01]     Priority: High   • Tobacco abuse [Z72.0]     Priority: Low   • Pneumonia [J18.9]   • Acute respiratory failure with hypoxia (HCC) [J96.01]   • Pleural effusion [J90]   • Closed fracture of multiple ribs of left side [S22.42XA]     59-year-old female the small bowel obstruction with recent pneumonias which seem more consistent with a CHF exacerbation.  We will start her on guideline directed medical therapy for heart failure.  We would likely pursue an ischemic work-up in the next few days when she has through her bowel obstruction.

## 2020-05-20 NOTE — PROGRESS NOTES
Assumed care of pt at 1900, report given.  A+O x 4, VSS, and on 3-5L O2 NC (baseline 3L).  Pt complaining of mild to moderate pain; medicated per MAR and tolerating well.  Pt has left posterior chest puncture site from thoracentesis. Dry gauze dressing CDI with no drainage.   Pt NPO sips with meds; denies N/V at this time.  +void  -BM (PTA 12+days)  Pt ambulating with a standby assist and tolerating well.   Pt updated on POC and all questions answered at this time.  Bed in lowest position, call light within reach, and no current needs.

## 2020-05-20 NOTE — PROGRESS NOTES
5/20  Diagnostic enema was not done yesterday despite order placed 5/18.  Will re-oreder again today, hopefully this can be completed.  Advised continued NPO and IVF for now.  May need to consider TPN as she has not been eating for almost a week at this point.

## 2020-05-21 NOTE — PROGRESS NOTES
Cardiology Follow Up Progress Note    Date of Service  5/21/2020    Attending Physician  Real Brooks M.D.    Chief Complaint   New onset CHF    HPI  Mae Paredes is a 59 y.o. female admitted 5/18/2020 with CHF    Interim Events  Barium enema yesterday  Feeling improved  Wants to eat  Fludis stopped    Review of Systems  Review of Systems   Constitutional: Negative.    HENT: Negative.    Eyes: Negative.    Respiratory: Negative.  Negative for shortness of breath.    Cardiovascular: Negative.  Negative for chest pain, palpitations and leg swelling.   Gastrointestinal: Negative.  Negative for abdominal distention and nausea.   Endocrine: Negative.    Genitourinary: Negative.    Musculoskeletal: Negative for arthralgias.   Skin: Negative.    Allergic/Immunologic: Negative.    Neurological: Negative.  Negative for dizziness, light-headedness and numbness.   Hematological: Negative.    Psychiatric/Behavioral: Negative.        Vital signs in last 24 hours  Temp:  [36.7 °C (98.1 °F)-37.3 °C (99.2 °F)] 37.3 °C (99.1 °F)  Pulse:  [65-81] 74  Resp:  [16-18] 16  BP: (136-149)/(75-87) 138/79  SpO2:  [91 %-99 %] 93 %    Physical Exam  Physical Exam  Vitals signs and nursing note reviewed.   Constitutional:       General: She is not in acute distress.     Appearance: Normal appearance. She is normal weight. She is not ill-appearing, toxic-appearing or diaphoretic.   HENT:      Head: Normocephalic and atraumatic.      Right Ear: Ear canal and external ear normal.      Left Ear: Ear canal and external ear normal.      Nose: Nose normal. No congestion or rhinorrhea.      Mouth/Throat:      Mouth: Mucous membranes are moist.      Pharynx: Oropharynx is clear. No oropharyngeal exudate or posterior oropharyngeal erythema.   Eyes:      General: No scleral icterus.        Right eye: No discharge.         Left eye: No discharge.      Extraocular Movements: Extraocular movements intact.      Conjunctiva/sclera: Conjunctivae normal.       Pupils: Pupils are equal, round, and reactive to light.   Neck:      Musculoskeletal: Normal range of motion and neck supple. No neck rigidity or muscular tenderness.      Vascular: No carotid bruit.   Cardiovascular:      Rate and Rhythm: Normal rate and regular rhythm.      Pulses: Normal pulses.      Heart sounds: Normal heart sounds. No murmur. No gallop.    Pulmonary:      Effort: Pulmonary effort is normal. No respiratory distress.      Breath sounds: Normal breath sounds. No stridor. No wheezing, rhonchi or rales.   Abdominal:      General: Abdomen is flat. Bowel sounds are normal. There is no distension.      Palpations: Abdomen is soft. There is no mass.      Tenderness: There is no abdominal tenderness. There is no guarding or rebound.      Hernia: No hernia is present.   Musculoskeletal: Normal range of motion.         General: No swelling or tenderness.      Right lower leg: No edema.      Left lower leg: No edema.   Lymphadenopathy:      Cervical: No cervical adenopathy.   Skin:     General: Skin is warm and dry.      Capillary Refill: Capillary refill takes 2 to 3 seconds.      Coloration: Skin is not jaundiced or pale.      Findings: No bruising or lesion.   Neurological:      General: No focal deficit present.      Mental Status: She is alert and oriented to person, place, and time. Mental status is at baseline.      Cranial Nerves: No cranial nerve deficit.      Motor: No weakness.   Psychiatric:         Mood and Affect: Mood normal.         Behavior: Behavior normal.         Thought Content: Thought content normal.         Judgment: Judgment normal.         Lab Review  Lab Results   Component Value Date/Time    WBC 10.0 05/20/2020 12:07 AM    RBC 3.75 (L) 05/20/2020 12:07 AM    HEMOGLOBIN 12.0 05/20/2020 12:07 AM    HEMATOCRIT 37.0 05/20/2020 12:07 AM    MCV 98.7 (H) 05/20/2020 12:07 AM    MCH 32.0 05/20/2020 12:07 AM    MCHC 32.4 (L) 05/20/2020 12:07 AM    MPV 10.6 05/20/2020 12:07 AM      Lab  Results   Component Value Date/Time    SODIUM 132 (L) 05/21/2020 04:28 AM    POTASSIUM 4.7 05/21/2020 04:28 AM    CHLORIDE 93 (L) 05/21/2020 04:28 AM    CO2 29 05/21/2020 04:28 AM    GLUCOSE 105 (H) 05/21/2020 04:28 AM    BUN 14 05/21/2020 04:28 AM    CREATININE 0.44 (L) 05/21/2020 04:28 AM      Lab Results   Component Value Date/Time    ASTSGOT 8 (L) 05/20/2020 12:07 AM    ALTSGPT 13 05/20/2020 12:07 AM     Lab Results   Component Value Date/Time    CHOLSTRLTOT 189 05/19/2020 03:17 AM    LDL 93 05/19/2020 03:17 AM    HDL 58 05/19/2020 03:17 AM    TRIGLYCERIDE 190 (H) 05/19/2020 03:17 AM    TROPONINT 27 (H) 05/18/2020 11:14 PM       Recent Labs     05/20/20  0007   NTPROBNP [id number]*       Cardiac Imaging and Procedures Review  Dated 5/19/2020 personally viewed but interpreted myself showing sinus rhythm with possible LVH.     ECHO:  Date 5/19/2020 personally viewed interpreted myself showing mild MR mild to moderate AI and an EF of 30%.       Imaging  Chest X-Ray:  N/A    Stress Test:  N/A    Assessment/Plan  No new Assessment & Plan notes have been filed under this hospital service since the last note was generated.  Service: Cardiology  59-year-old female with new onset heart failure with reduced ejection fraction of unknown etiology.  We will get a stress test for her tomorrow.  When she is able to tolerate p.o.'s and solid foods we will start her on guideline directed medical therapy.  Given [place] nature [place] I would not recommend sending her home without a thorough evaluation from cardiology.  Anticipate her going home in approximately 2 days.    Thank you for allowing me to participate in the care of this patient.  I will continue to follow this patient    Please contact me with any questions.    Maikel Castillo M.D.   Cardiologist, [facility]  [phone]

## 2020-05-21 NOTE — PROGRESS NOTES
Bedside report received.  Assessment complete.  A&O x 4. Patient calls appropriately.  Patient ambulates with SBA assist.   Patient has 4/10 pain. Pain managed with prescribed medications.  Denies N&V. Pt requesting to eat food. NPO at this time.  + void, - flatus, - BM.  Patient denies SOB.  Review plan with of care with patient. Call light and personal belongings with in reach. Hourly rounding in place. All needs met at this time.

## 2020-05-21 NOTE — PROGRESS NOTES
Hospital Medicine Daily Progress Note    Date of Service  5/21/2020    Chief Complaint  Constipation    Hospital Course   Ms. Paredes is a 59-year-old female who was air flighted from a hospital in [place], [place] for constipation and pneumonia.  He has a past medical history of oxygen dependent COPD.  She reportedly had not had a bowel movement in over 2 weeks.  Abdominal x-ray revealed a high fecal impaction and empty rectum.  She is also been notably short of breath x2 weeks.  A chest x-ray was done and showed increased intravascular congestion and a left-sided pleural effusion.  A CT of the abdomen was also done and noted a large amount of stool within the colon but no intraperitoneal air or free fluid.  A CT scan was also done of the chest on 5/17/2020 and found a small left pleural effusion with atelectatic changes at the left lung base with an acute and subacute fracture of the left sixth and ninth rib. She was subsequently admitted to the hospital for further evaluation and treatment where a thoracentesis was done on 5/19/2020 with 60 cc taken off. An echocardiogram was also done and showed an ejection fraction of 30% with grade III diastolic dysfunction. Cardiology was consulted and placed her on the appropriate medication regimen. They also recommended an ischemic workup after her acute issues resolve. Surgery was consulted for her possible large bowel obstruction and on 5/20/2020 a diagnostic enema was done and was negative for obstruction.      Interval Problem Update  Now having bowel movements. Abdomen with less distention, is softer, and I am now able to palpate it without pain. Diagnostic enema showed constipation.  Started clear liquid diet this morning which she has tolerated well so far. We will advance this as tolerated.     Weaned steroids to q12h.    Sodium up to 132 overnight. Chloride down to 93.     T-max 99.2°F overnight, HR 70s-80s, SBP 120s-140s, O2 saturations within the normal range on 4  L/min of oxygen via nasal cannula.    Consultants/Specialty  General surgery  Cardiology    Code Status  Full code    Disposition  Clinical for now.    Review of Systems  Review of Systems   Constitutional: Negative for fever and malaise/fatigue.   Respiratory: Positive for shortness of breath (continues to improve daily). Negative for cough, hemoptysis, sputum production and wheezing.    Cardiovascular: Negative for chest pain, palpitations and leg swelling.   Gastrointestinal: Negative for abdominal pain, constipation (resolved), diarrhea, nausea and vomiting.   Genitourinary: Negative for dysuria, frequency and urgency.   Neurological: Negative for dizziness, sensory change, speech change, focal weakness, weakness and headaches.   Psychiatric/Behavioral: Negative for depression. The patient is not nervous/anxious and does not have insomnia.    All other systems reviewed and are negative.     Physical Exam  Temp:  [36.7 °C (98.1 °F)-37.3 °C (99.2 °F)] 37.3 °C (99.1 °F)  Pulse:  [65-81] 74  Resp:  [16-18] 16  BP: (136-149)/(75-87) 138/79  SpO2:  [91 %-99 %] 93 %    Physical Exam  Vitals signs and nursing note reviewed.   Constitutional:       General: She is awake.      Appearance: She is well-developed. She is ill-appearing (acute & chronically ill-appearing).      Interventions: Nasal cannula in place.   HENT:      Head: Normocephalic and atraumatic.      Mouth/Throat:      Lips: Pink.      Mouth: Mucous membranes are moist.   Eyes:      Extraocular Movements: Extraocular movements intact.      Conjunctiva/sclera: Conjunctivae normal.      Pupils: Pupils are equal, round, and reactive to light.      Comments: Dysconjugate gaze   Neck:      Musculoskeletal: Normal range of motion and neck supple.   Cardiovascular:      Rate and Rhythm: Normal rate and regular rhythm.      Pulses: Normal pulses.      Heart sounds: Murmur present.   Pulmonary:      Effort: Pulmonary effort is normal. No accessory muscle usage.       Breath sounds: Decreased air movement present. Examination of the right-upper field reveals decreased breath sounds. Examination of the left-upper field reveals decreased breath sounds. Examination of the right-middle field reveals decreased breath sounds. Examination of the right-lower field reveals decreased breath sounds. Examination of the left-lower field reveals decreased breath sounds. Decreased breath sounds present.   Abdominal:      General: Abdomen is protuberant. Bowel sounds are normal. There is distension (improved since yesterday). There is no abdominal bruit.      Tenderness: There is no abdominal tenderness (more tender today).      Comments: Abdomen now soft   Musculoskeletal:      Right lower leg: No edema.      Left lower leg: No edema.   Skin:     General: Skin is warm and dry.   Neurological:      General: No focal deficit present.      Mental Status: She is alert and oriented to person, place, and time.      GCS: GCS eye subscore is 4. GCS verbal subscore is 5. GCS motor subscore is 6.   Psychiatric:         Attention and Perception: Attention and perception normal.         Mood and Affect: Affect is labile and angry.         Speech: Speech normal.         Behavior: Behavior is cooperative.         Thought Content: Thought content normal.         Cognition and Memory: Cognition normal.         Judgment: Judgment normal.     Fluids    Intake/Output Summary (Last 24 hours) at 5/21/2020 1500  Last data filed at 5/21/2020 1233  Gross per 24 hour   Intake 0 ml   Output 0 ml   Net 0 ml     Laboratory  Recent Labs     05/18/20 1949 05/20/20 0007   WBC 11.9* 10.0   RBC 4.35 3.75*   HEMOGLOBIN 13.9 12.0   HEMATOCRIT 42.5 37.0   MCV 97.7 98.7*   MCH 32.0 32.0   MCHC 32.7* 32.4*   RDW 47.5 45.7   PLATELETCT 324 245   MPV 9.9 10.6     Recent Labs     05/18/20 1949 05/20/20 0007 05/21/20  0428   SODIUM 137 130* 132*   POTASSIUM 4.4 4.4 4.7   CHLORIDE 96 95* 93*   CO2 29 28 29   GLUCOSE 85 113* 105*    BUN 16 14 14   CREATININE 0.42* 0.36* 0.44*   CALCIUM 9.1 8.6 9.1     Recent Labs     05/19/20  0846   INR 1.04     Recent Labs     05/19/20  0317   TRIGLYCERIDE 190*   HDL 58   LDL 93     Imaging  DX-BARIUM ENEMA   Final Result      Large amount of stool throughout the colon suggesting constipation      US-THORACENTESIS PUNCTURE LEFT   Final Result      1. Ultrasound guided left sided diagnostic thoracentesis.      2. 60 mL of fluid withdrawn.      DX-CHEST-PORTABLE (1 VIEW)   Final Result      No pneumothorax following diagnostic left thoracentesis.      EC-ECHOCARDIOGRAM COMPLETE W/O CONT   Final Result      HT-BEVGWOP-6 VIEW   Final Result      Mildly above-average colonic stool volume      No radiographic evidence of small bowel obstruction. No abnormal colon dilatation      Left pelvis calcifications most likely represent large phleboliths. These are more lateral than typical for urolithiasis or leiomyomas.      DX-CHEST-PORTABLE (1 VIEW)   Final Result      Moderate left pleural effusion with basilar consolidation or atelectasis      Interstitial edema      Hyperinflation, cardiac silhouette enlargement         Assessment/Plan  * Slow transit constipation- (present on admission)  Assessment & Plan  -S/p multiple laxatives & 1 dose of relistor at outlying facility.   -Surgery consulted. Diagnostic enema showed no obstruction. Now having bowel movements & tolerating a diet which will be advanced as she continues to tolerate.     Acute on chronic respiratory failure with hypoxia (HCC)- (present on admission)  Assessment & Plan  -Reportedly on 3 LPM of O2 at baseline.  -Secondary to pleural effusion (now resolved) & COPD exacerbation.   -Continue aggressive RT therapy.   -Continue I.S. as able.   -Continue mucinex.   -Continue oxygen therapy as required.     Pneumonia- (present on admission)  Assessment & Plan  -Was on IV ceftriaxone & PO doxy empirically. However, her symptoms are most likely not related to a  pneumonia, her procal is negative, and her pleural fluid is transudative. Antibiotics therefore stopped on 5/20/2020.     -Continue aggressive RT therapy.   -Continue I.S. as able.   -Continue mucinex.   -Continue oxygen therapy as required.     Chronic obstructive pulmonary disease with acute exacerbation (HCC)- (present on admission)  Assessment & Plan  -Continue IV solumedrol for now. Weaned to q12 hours today.   -Continue aggressive RT therapy.   -Continue mucinex.     Acute hyponatremia  Assessment & Plan  -Trending up. Started a diet today. Will recheck BMP tomorrow.     Cardiomyopathy (HCC)- (present on admission)  Assessment & Plan  -Echo showed mixed systolic/diastolic dysfunction. EF 30%.   -Cardiology following, started her on ASA, statin, losartan, & metoprolol on 5/19/2020. Planning for ischemic workup after large bowel obstruction resolved.     Pleural effusion, left- (present on admission)  Assessment & Plan  -S/p thoracentesis 5/19/2020 with only 60cc of fluid removed. Fluid is transudative, suspect secondary to heart failure. Path & culture still pending.   -Echocardiogram with EF 30% & grade III diastolic dysfunction. Cardiology following.    Tobacco abuse- (present on admission)  Assessment & Plan  -Nicotine replacement protocol ordered.   -Continue to encourage cessation.     Closed fracture of multiple ribs of left side, subacute- (present on admission)  Assessment & Plan  -Pt refused lidoderm patch, stated the rib fractures are old and don't bother her.   -Continue I.S. as tolerated.      VTE prophylaxis: Lovenox      Electronically signed by:  Susan Mathias, MSN, RN, APRN, ACNPC-AG, CCRN  Nurse Practitioner, [facility]  Work # [phone]    5/21/2020    3:00 PM

## 2020-05-21 NOTE — CARE PLAN
Problem: Communication  Goal: The ability to communicate needs accurately and effectively will improve  Outcome: PROGRESSING AS EXPECTED   Pt is able to call staff for assistance and communicate needs effectively   Problem: Safety  Goal: Will remain free from injury  Outcome: PROGRESSING AS EXPECTED   Pt has remained free from injury. Calls for assistance, up x1-2 to bathroom. Skin assessed. Hourly rounding in place

## 2020-05-21 NOTE — DISCHARGE PLANNING
This RN CM spoke with Pt over the phone.  I explained to her that My role is to assist her with discharge if she has needs. Per Pt , she wants to speak to her Doctor first.   This RN CM will follow and assist as needed.

## 2020-05-21 NOTE — PROGRESS NOTES
"Assumed care of pt at 1900, report given.  A+O x 4, VSS, and on 3-5L O2 NC (baseline 3L).  Pt agitated and anxious this evening and stating \"there is nothing wrong with my heart, you are all wrong\". This RN provided education and explanations, but the pt is not accepting her recent diagnosis and states she \"just wants food\" and to \"fix what I care about\".  Pt complaining of mild to moderate pain; medicated per MAR and tolerating well.  Pt has left posterior chest puncture site from thoracentesis. Band-aid in place CDI with no drainage.   Pt NPO sips with meds; denies N/V at this time. Pt verbalizing that she is hungry and wants food despite education.  +void  -BM (PTA 13+days)  Pt ambulating with a standby assist and tolerating well.   Pt updated on POC and all questions answered at this time.  Bed in lowest position, call light within reach, and no current needs.     Pt's  Ersamo called this evening and update given per pt's request.   "

## 2020-05-21 NOTE — PROGRESS NOTES
5/21  Pt seen and examined, diagnostic enema completed yesterday, contrast reached the cecum, revealed dense stool throughout the colon, but no evidence of any obstruction or annular lesion noted.  Since this was completed she has been having copious loose stool output.  She is tolerating a diet and while apetite is minimal, she is able to keep some food down.    Exam benign, mild distension only    Agree with ongoing diet as tolerated, would add Miralax daily and keep this as a standing med to prevent future constipation. She warrants a follow up colonoscopy, but this could be done as an outpatient when she is ready.  Other cardiac and pneumonia issues per primary team.  From a surgery standpoint, no operative intervention is planned and she is cleared for discharge when medically stable  Please call with any questions.

## 2020-05-21 NOTE — DISCHARGE PLANNING
This RN CM received a call from Afia Robbins .Itzel's contact number is Tel [phone] ext [phone]. She can be contacted in case we need Pre-Auth.

## 2020-05-22 PROBLEM — J90 PLEURAL EFFUSION, LEFT: Status: RESOLVED | Noted: 2020-01-01 | Resolved: 2020-01-01

## 2020-05-22 PROBLEM — S22.42XA CLOSED FRACTURE OF MULTIPLE RIBS OF LEFT SIDE: Status: RESOLVED | Noted: 2020-01-01 | Resolved: 2020-01-01

## 2020-05-22 PROBLEM — K59.01 SLOW TRANSIT CONSTIPATION: Status: RESOLVED | Noted: 2020-01-01 | Resolved: 2020-01-01

## 2020-05-22 PROBLEM — J18.9 PNEUMONIA: Status: RESOLVED | Noted: 2020-01-01 | Resolved: 2020-01-01

## 2020-05-22 NOTE — CARE PLAN
Problem: Knowledge Deficit  Goal: Knowledge of disease process/condition, treatment plan, diagnostic tests, and medications will improve  Outcome: PROGRESSING AS EXPECTED   Pt educated on POC and cardiac stress test scheduled for today. Pt verbalizes understanding   Problem: Psychosocial Needs:  Goal: Level of anxiety will decrease  Outcome: PROGRESSING AS EXPECTED   Pt 's anxiety has decreased from last night. Pt encouraged to voice feelings to staff and given techniques to decreases anxiety.

## 2020-05-22 NOTE — PROGRESS NOTES
1258 Pt off floor with transport for stress test     1440 pt returned to floor. No complaints of pain at this time, all needs met.

## 2020-05-22 NOTE — PROGRESS NOTES
Pt called out demanding to see a doctor due to unrelieved pain. Pt assisted to chair and offered heat pack. Hospitalist notified and new order received. Pt adamant that oral pain meds will not touch it, but encouraged to try new order.

## 2020-05-22 NOTE — CARE PLAN
Problem: Communication  Goal: The ability to communicate needs accurately and effectively will improve  Outcome: PROGRESSING AS EXPECTED     Problem: Safety  Goal: Will remain free from injury  Outcome: PROGRESSING AS EXPECTED  Goal: Will remain free from falls  Outcome: PROGRESSING AS EXPECTED     Problem: Infection  Goal: Will remain free from infection  Outcome: PROGRESSING AS EXPECTED     Problem: Venous Thromboembolism (VTW)/Deep Vein Thrombosis (DVT) Prevention:  Goal: Patient will participate in Venous Thrombosis (VTE)/Deep Vein Thrombosis (DVT)Prevention Measures  Outcome: PROGRESSING AS EXPECTED     Problem: Bowel/Gastric:  Goal: Normal bowel function is maintained or improved  Outcome: PROGRESSING AS EXPECTED  Goal: Will not experience complications related to bowel motility  Outcome: PROGRESSING AS EXPECTED     Problem: Fluid Volume:  Goal: Will maintain balanced intake and output  Outcome: PROGRESSING AS EXPECTED     Problem: Pain Management  Goal: Pain level will decrease to patient's comfort goal  Outcome: PROGRESSING AS EXPECTED     Problem: Psychosocial Needs:  Goal: Level of anxiety will decrease  Outcome: PROGRESSING AS EXPECTED     Problem: Respiratory:  Goal: Respiratory status will improve  Outcome: PROGRESSING AS EXPECTED     Problem: Respiratory:  Goal: Respiratory status will improve  Outcome: PROGRESSING AS EXPECTED     Problem: Mobility:  Goal: Capacity to carry out activities will improve  Outcome: PROGRESSING AS EXPECTED

## 2020-05-22 NOTE — RESPIRATORY CARE
Oxygen Rounds      Patient found on    O2 L/m:  3  Oxygen device:  Nasal Cannula  Spo2: 96%      Respiratory device skin site inspection completed.

## 2020-05-22 NOTE — CARE PLAN
Problem: Nutritional:  Goal: Achieve adequate nutritional intake  Description: Patient will consume 50% of meals.  Outcome: PROGRESSING AS EXPECTED   Diet advanced to Full Liquids 5/21.  Recorded PO intake 25-50%.

## 2020-05-22 NOTE — DISCHARGE PLANNING
Care Transition Team Assessment    This RN CM spoke with Erasmo Paredes, Pt;s Spouse for this assessment. Erasmo verified accuracy of the Face Sheet Demographics. Pt lives with her  Erasmo in a 2 story house in [place], [place].  Pt has an oxygen concentrator at home but no oxygen tanks. Pt has a PCP, Dr Bacrenas  and her preferred pharmacy is [facility] over at [place], [place]. Pt is independent with most ADLs prior to this hospitalization.    Per Erasmo, he can provide care and support at home once Pt discahrged to home and he can provide transport for Pt.      Information Source  Orientation : Oriented x 4  Information Given By: Spouse  Informant's Name: Erasmo Paredes  Who is responsible for making decisions for patient? : Patient    Readmission Evaluation  Is this a readmission?: No    Elopement Risk  Legal Hold: No  Ambulatory or Self Mobile in Wheelchair: Yes  Disoriented: No  Psychiatric Symptoms: None  History of Wandering: No  Elopement this Admit: No  Vocalizing Wanting to Leave: No  Displays Behaviors, Body Language Wanting to Leave: No-Not at Risk for Elopement  Elopement Risk: Not at Risk for Elopement    Interdisciplinary Discharge Planning  Primary Care Physician: Michael Marquez MD  Lives with - Patient's Self Care Capacity: Spouse  Patient or legal guardian wants to designate a caregiver (see row info): No  Support Systems: Spouse / Significant Other  Do You Take your Prescribed Medications Regularly: Yes  Able to Return to Previous ADL's: Yes  Mobility Issues: No  Prior Services: Home-Independent  Patient Expects to be Discharged to:: (Home with Help)  Assistance Needed: Unknown at this Time    Discharge Preparedness  What is your plan after discharge?: Home with help  What are your discharge supports?: Spouse  Prior Functional Level: Ambulatory, Independent with Activities of Daily Living, Independent with Medication Management    Functional Assesment  Prior Functional Level: Ambulatory, Independent  with Activities of Daily Living, Independent with Medication Management    Finances  Financial Barriers to Discharge: No  Prescription Coverage: Yes    Vision / Hearing Impairment  Vision Impairment : Yes  Left Eye Vision: Other (Comments)(lazy eye)  Hearing Impairment : Yes     Advance Directive  Advance Directive?: None    Domestic Abuse  Have you ever been the victim of abuse or violence?: No  Physical Abuse or Sexual Abuse: No  Verbal Abuse or Emotional Abuse: No  Possible Abuse Reported to: Not Applicable    Psychological Assessment  History of Substance Abuse: None  History of Psychiatric Problems: No  Non-compliant with Treatment: No  Newly Diagnosed Illness: No    Discharge Risks or Barriers  Discharge risks or barriers?: Other (comment)    Anticipated Discharge Information  Anticipated discharge disposition: Home  Discharge Address: [facility] [address] [place] [address]  Discharge Contact Phone Number: [phone]

## 2020-05-22 NOTE — PROGRESS NOTES
Pt resting comfortably in chair. Recliner brought in per pt request. No new complaints at this time.

## 2020-05-22 NOTE — PROGRESS NOTES
Bedside report received.  Assessment complete.  A&O x 4. Patient calls appropriately.  Patient ambulates with SBA assist.   Patient has 4/10 pain. Pain managed with prescribed medications.  Denies N&V. Tolerating full liquid diet. Pt now NPO for stress test at 1300.  + void, + flatus, BM water stools 5/21  Patient denies SOB.    Review plan with of care with patient. Call light and personal belongings with in reach. Hourly rounding in place. All needs met at this time..

## 2020-05-22 NOTE — PROGRESS NOTES
"Hospital Medicine Daily Progress Note    Date of Service  5/22/2020    Chief Complaint  Constipation    Hospital Course   Ms. Paredes is a 59-year-old female who was air flighted from a hospital in [place], [place] for constipation and pneumonia.  He has a past medical history of oxygen dependent COPD.  She reportedly had not had a bowel movement in over 2 weeks.  Abdominal x-ray revealed a high fecal impaction and empty rectum.  She is also been notably short of breath x2 weeks.  A chest x-ray was done and showed increased intravascular congestion and a left-sided pleural effusion.  A CT of the abdomen was also done and noted a large amount of stool within the colon but no intraperitoneal air or free fluid.  A CT scan was also done of the chest on 5/17/2020 and found a small left pleural effusion with atelectatic changes at the left lung base with an acute and subacute fracture of the left sixth and ninth rib. She was subsequently admitted to the hospital for further evaluation and treatment where a thoracentesis was done on 5/19/2020 with 60 cc taken off. An echocardiogram was also done and showed an ejection fraction of 30% with grade III diastolic dysfunction. Cardiology was consulted and placed her on the appropriate medication regimen. They also recommended an ischemic workup after her acute issues resolve. Surgery was consulted for her possible large bowel obstruction and on 5/20/2020 a diagnostic enema was done and was negative for obstruction.      Interval Problem Update  Had a rough night. Had \"pain all over\", now improved.     Na stable at 131 today. Renal function remains intact.      T-max 99.3°F overnight, HR 60s-80s, SBP 130s-160s, O2 saturations within the normal range on 2-4 L/min of oxygen via nasal cannula.    Stress test today.     Consultants/Specialty  General surgery  Cardiology    Code Status  Full code    Disposition  Clinical for now.    Review of Systems  Review of Systems   Constitutional: " "Negative for fever and malaise/fatigue.   Respiratory: Positive for cough. Negative for hemoptysis, sputum production, shortness of breath and wheezing.    Cardiovascular: Negative for chest pain, palpitations and leg swelling.   Gastrointestinal: Negative for abdominal pain, blood in stool, constipation, diarrhea, melena, nausea and vomiting.   Genitourinary: Negative for dysuria, frequency and urgency.   Musculoskeletal: Positive for back pain and neck pain.        Pain \"all over\"   Neurological: Negative for dizziness, sensory change, speech change, focal weakness, weakness and headaches.   Psychiatric/Behavioral: Negative for depression. The patient is not nervous/anxious and does not have insomnia.    All other systems reviewed and are negative.     Physical Exam  Temp:  [36.9 °C (98.4 °F)-37.4 °C (99.4 °F)] 36.9 °C (98.4 °F)  Pulse:  [60-89] 69  Resp:  [18-22] 18  BP: (111-162)/(69-93) 111/69  SpO2:  [94 %-100 %] 96 %    Physical Exam  Vitals signs and nursing note reviewed.   Constitutional:       General: She is awake.      Appearance: She is well-developed. She is ill-appearing (acute & chronically ill-appearing).      Interventions: Nasal cannula in place.   HENT:      Head: Normocephalic and atraumatic.      Mouth/Throat:      Lips: Pink.      Mouth: Mucous membranes are moist.   Eyes:      Extraocular Movements: Extraocular movements intact.      Conjunctiva/sclera: Conjunctivae normal.      Pupils: Pupils are equal, round, and reactive to light.      Comments: Dysconjugate gaze   Neck:      Musculoskeletal: Normal range of motion and neck supple.   Cardiovascular:      Rate and Rhythm: Normal rate and regular rhythm.      Pulses: Normal pulses.      Heart sounds: Murmur present.   Pulmonary:      Effort: Pulmonary effort is normal. No accessory muscle usage.      Breath sounds: Decreased air movement present. Examination of the right-upper field reveals decreased breath sounds. Examination of the " left-upper field reveals decreased breath sounds. Examination of the right-middle field reveals decreased breath sounds. Examination of the right-lower field reveals decreased breath sounds. Examination of the left-lower field reveals decreased breath sounds. Decreased breath sounds present.   Abdominal:      General: Abdomen is protuberant. Bowel sounds are normal. There is no distension or abdominal bruit.      Tenderness: There is no abdominal tenderness.      Comments: Abdomen now soft   Musculoskeletal:      Right lower leg: No edema.      Left lower leg: No edema.   Skin:     General: Skin is warm and dry.   Neurological:      General: No focal deficit present.      Mental Status: She is alert and oriented to person, place, and time.      GCS: GCS eye subscore is 4. GCS verbal subscore is 5. GCS motor subscore is 6.   Psychiatric:         Attention and Perception: Attention and perception normal.         Mood and Affect: Affect is labile and angry.         Speech: Speech normal.         Behavior: Behavior is cooperative.         Thought Content: Thought content normal.         Cognition and Memory: Cognition normal.         Judgment: Judgment normal.     Fluids    Intake/Output Summary (Last 24 hours) at 5/22/2020 1210  Last data filed at 5/22/2020 0800  Gross per 24 hour   Intake 50 ml   Output 0 ml   Net 50 ml     Laboratory  Recent Labs     05/20/20  0007   WBC 10.0   RBC 3.75*   HEMOGLOBIN 12.0   HEMATOCRIT 37.0   MCV 98.7*   MCH 32.0   MCHC 32.4*   RDW 45.7   PLATELETCT 245   MPV 10.6     Recent Labs     05/20/20  0007 05/21/20  0428 05/22/20  0425   SODIUM 130* 132* 131*   POTASSIUM 4.4 4.7 4.0   CHLORIDE 95* 93* 92*   CO2 28 29 29   GLUCOSE 113* 105* 84   BUN 14 14 11   CREATININE 0.36* 0.44* 0.44*   CALCIUM 8.6 9.1 8.9     Imaging  DX-BARIUM ENEMA   Final Result      Large amount of stool throughout the colon suggesting constipation      US-THORACENTESIS PUNCTURE LEFT   Final Result      1. Ultrasound  guided left sided diagnostic thoracentesis.      2. 60 mL of fluid withdrawn.      DX-CHEST-PORTABLE (1 VIEW)   Final Result      No pneumothorax following diagnostic left thoracentesis.      EC-ECHOCARDIOGRAM COMPLETE W/O CONT   Final Result      [id number] VIEW   Final Result      Mildly above-average colonic stool volume      No radiographic evidence of small bowel obstruction. No abnormal colon dilatation      Left pelvis calcifications most likely represent large phleboliths. These are more lateral than typical for urolithiasis or leiomyomas.      DX-CHEST-PORTABLE (1 VIEW)   Final Result      Moderate left pleural effusion with basilar consolidation or atelectasis      Interstitial edema      Hyperinflation, cardiac silhouette enlargement      NM-CARDIAC STRESS TEST    (Results Pending)      Assessment/Plan  Acute on chronic respiratory failure with hypoxia (HCC)- (present on admission)  Assessment & Plan  -On 3 LPM of O2 at baseline.  -Secondary to pleural effusion (now resolved) & COPD exacerbation.   -Continue aggressive RT therapy.   -Continue I.S. as able.   -Continue mucinex.   -Continue oxygen therapy as required.     Chronic obstructive pulmonary disease with acute exacerbation (HCC)- (present on admission)  Assessment & Plan  -Continue IV solumedrol for now. Weaned to q12 hours today.   -Continue aggressive RT therapy.   -Continue mucinex.     Acute hyponatremia  Assessment & Plan  -Stable today. Recheck BMP tomorrow.     Cardiomyopathy (HCC)- (present on admission)  Assessment & Plan  -Echo showed mixed systolic/diastolic dysfunction. EF 30%.   -Cardiology following, started her on ASA, statin, losartan, & metoprolol on 5/19/2020.   -Ischemic workup to ensue now that large bowel obstruction has resolved. Stress test scheduled today.     Tobacco abuse- (present on admission)  Assessment & Plan  -Nicotine replacement protocol ordered.   -Continue to encourage cessation.      VTE prophylaxis:  Lovenox      Electronically signed by:  Susan Mathias, MSN, RN, APRN, ACNPC-AG, CCRN  Nurse Practitioner, [facility]  Work # [phone]    5/22/2020    12:11 PM

## 2020-05-22 NOTE — PROGRESS NOTES
Pt called out wanting to speak to the doctor about IV pain meds. Hospitalist notified and new order received.

## 2020-05-23 NOTE — CARE PLAN
Problem: Communication  Goal: The ability to communicate needs accurately and effectively will improve  5/23/2020 1341 by Gonzalez Palmer R.N.  Outcome: MET  5/23/2020 1058 by Gonzalez Palmer R.N.  Outcome: PROGRESSING AS EXPECTED     Problem: Safety  Goal: Will remain free from injury  5/23/2020 1341 by Gonzalez Palmer R.N.  Outcome: MET  5/23/2020 1058 by Gonzalez Palmer R.N.  Outcome: PROGRESSING AS EXPECTED  Goal: Will remain free from falls  5/23/2020 1341 by Gonzalez Palmer R.N.  Outcome: MET  5/23/2020 1058 by Gonzalez Palmer R.N.  Outcome: PROGRESSING AS EXPECTED     Problem: Infection  Goal: Will remain free from infection  Outcome: MET     Problem: Venous Thromboembolism (VTW)/Deep Vein Thrombosis (DVT) Prevention:  Goal: Patient will participate in Venous Thrombosis (VTE)/Deep Vein Thrombosis (DVT)Prevention Measures  Outcome: MET     Problem: Bowel/Gastric:  Goal: Normal bowel function is maintained or improved  Outcome: MET  Goal: Will not experience complications related to bowel motility  Outcome: MET     Problem: Knowledge Deficit  Goal: Knowledge of disease process/condition, treatment plan, diagnostic tests, and medications will improve  5/23/2020 1341 by Gonzalez Palmer R.N.  Outcome: MET  5/23/2020 1058 by Gonzlaez Palmer R.N.  Outcome: PROGRESSING AS EXPECTED  Goal: Knowledge of the prescribed therapeutic regimen will improve  5/23/2020 1341 by Gonzalez Palmer R.N.  Outcome: MET  5/23/2020 1058 by Gonzalez Palmer R.N.  Outcome: PROGRESSING AS EXPECTED     Problem: Discharge Barriers/Planning  Goal: Patient's continuum of care needs will be met  5/23/2020 1341 by Gonzalez Palmer R.N.  Outcome: MET  5/23/2020 1058 by Gonzalez Palmer R.N.  Outcome: PROGRESSING AS EXPECTED     Problem: Fluid Volume:  Goal: Will maintain balanced intake and output  Outcome: MET     Problem: Pain Management  Goal: Pain level will decrease to patient's comfort goal  Outcome: MET     Problem: Psychosocial Needs:  Goal: Level of anxiety will  decrease  Outcome: MET     Problem: Respiratory:  Goal: Respiratory status will improve  Outcome: MET     Problem: Nutritional:  Goal: Achieve adequate nutritional intake  Description: Patient will consume 50% of meals.  Outcome: MET     Problem: Psychosocial Needs:  Goal: Ability to adjust to condition or change in health will improve  Outcome: MET  Goal: Mental status will improve  Outcome: MET     Problem: Cardiac:  Goal: Cardiovascular alteration will improve  Outcome: MET  Goal: Hemodynamic stability will improve  Outcome: MET     Problem: Fluid Volume:  Goal: Risk for excess fluid volume will decrease  Outcome: MET     Problem: Respiratory:  Goal: Respiratory status will improve  Outcome: MET  Goal: Ability to maintain a clear airway will improve  Outcome: MET     Problem: Nutritional:  Goal: Ability to attain and maintain optimal nutritional status will improve  Outcome: MET  Goal: Ability to identify appropriate dietary choices will improve  Outcome: MET     Problem: Mobility:  Goal: Capacity to carry out activities will improve  Outcome: MET     Problem: Physical Regulation:  Goal: Complications related to the disease process, condition or treatment will be avoided or minimized  Outcome: MET  Goal: Diagnostic test results will improve  Outcome: MET     Problem: Knowledge Deficit:  Goal: Knowledge of the prescribed therapeutic regimen will improve  5/23/2020 1341 by Gonzalez Palmer R.N.  Outcome: MET  5/23/2020 1058 by Gonzalez Palmer R.N.  Outcome: PROGRESSING AS EXPECTED  Goal: Understanding of discharge needs will improve  Outcome: MET     Problem: Health Behavior:  Goal: Identification of ways to alter habits to positively affect health status will improve  Outcome: MET  Goal: Ability to seek appropriate health care will improve  Outcome: MET

## 2020-05-23 NOTE — PROGRESS NOTES
Cardiology Follow Up Progress Note    Date of Service  5/23/2020    Attending Physician  Real Brooks M.D.    Chief Complaint   Constipation x12 days, dyspnea x2 weeks, wheezing, dry cough, chest x-ray with pneumonia, COPD exacerbation      Cardiology consultation secondary to new cardiomyopathy, LVEF 30% based on echo 5/19/2020    HPI  Mae Paredes is a 59 y.o. female admitted 5/18/2020 with COPD exacerbation, recurrent pneumonia, left pleural effusion, severe constipation, found to have new cardiomyopathy with EF 30% and grade 3 diastolic dysfunction, unclear etiology, underwent MPI this admission, negative for ischemia.      Past medical history significant for COPD, tobacco abuse        Sodium 132  Potassium 3.8  Creatinine 0.46  NT proBNP 6000  Interim Events    5/23/20 no overnight cardiac events, very anxious and frustrated to go home, we discussed heart failure symptoms, we discussed checking blood pressure routinely, we discussed daily weight, close follow-up with heart failure clinic, patient is in agreement.  Review of Systems  Review of Systems   Respiratory: Negative for apnea, cough, choking, chest tightness, shortness of breath, wheezing and stridor.        Vital signs in last 24 hours  Temp:  [36.8 °C (98.3 °F)-37.2 °C (99 °F)] 36.8 °C (98.3 °F)  Pulse:  [63-71] 68  Resp:  [16-18] 16  BP: (122-147)/(74-88) 122/88  SpO2:  [94 %-100 %] 100 %    Physical Exam  Physical Exam  Cardiovascular:      Rate and Rhythm: Normal rate and regular rhythm.   Pulmonary:      Effort: Pulmonary effort is normal.   Abdominal:      General: Abdomen is flat.   Skin:     General: Skin is warm.   Neurological:      General: No focal deficit present.      Mental Status: She is alert.   Psychiatric:         Mood and Affect: Mood normal.         Lab Review  Lab Results   Component Value Date/Time    WBC 10.8 05/23/2020 04:12 AM    RBC 3.92 (L) 05/23/2020 04:12 AM    HEMOGLOBIN 12.5 05/23/2020 04:12 AM    HEMATOCRIT 37.8  2020 04:12 AM    MCV 96.4 2020 04:12 AM    MCH 31.9 2020 04:12 AM    MCHC 33.1 (L) 2020 04:12 AM    MPV 10.7 2020 04:12 AM      Lab Results   Component Value Date/Time    SODIUM 132 (L) 2020 04:12 AM    POTASSIUM 3.8 2020 04:12 AM    CHLORIDE 94 (L) 2020 04:12 AM    CO2 29 2020 04:12 AM    GLUCOSE 102 (H) 2020 04:12 AM    BUN 15 2020 04:12 AM    CREATININE 0.46 (L) 2020 04:12 AM      Lab Results   Component Value Date/Time    ASTSGOT 8 (L) 2020 12:07 AM    ALTSGPT 13 2020 12:07 AM     Lab Results   Component Value Date/Time    CHOLSTRLTOT 189 2020 03:17 AM    LDL 93 2020 03:17 AM    HDL 58 2020 03:17 AM    TRIGLYCERIDE 190 (H) 2020 03:17 AM    TROPONINT 27 (H) 2020 11:14 PM       No results for input(s): NTPROBNP in the last 72 hours.    Cardiac Imaging and Procedures Review  EK2020 sinus rhythm with LVH    Echocardiogram: Normal left ventricular chamber size. Mild concentric left ventricular   hypertrophy. Moderately reduced left ventricular systolic function.   Left ventricular ejection fraction is visually estimated to be 30%.   Hypokinetic septal walls. Grade III diastolic dysfunction (restrictive   pattern).        Cardiac Catheterization: Not applicable    Imaging  Chest X-Ray: Moderate left pleural effusion with basilar consolidation or atelectasis     Interstitial edema     Hyperinflation, cardiac silhouette enlargement       2020 ultrasound guided left sided diagnostic thoracentesis.     2. 60 mL of fluid withdrawn.        Stress Test: Not applicable    Assessment/Plan    New cardiomyopathy, etiology unclear, underwent MPI this admission, revealed no ischemia.  HFrEF, stage P NYHA class I, no acute exacerbation  Pleural effusion status post diagnostic thoracentesis 2020  Large bowel obstruction, resolved  Tobacco use, discussed cessation  Continue with optimal guided medical  therapy  Aspirin 81 mg, atorvastatin 40 mg, lisinopril 2.5 mg, Toprol-XL 12.5 mg, spironolactone 12.5 mg.  Close follow-up with heart failure clinic, will arrange.  Stable for discharge from cardiology stand.  .  Please contact me with any questions.    DANDRE Galvez.   Cardiologist, [facility]  [phone]

## 2020-05-23 NOTE — DISCHARGE INSTRUCTIONS
Discharge Instructions    Discharged to home by car with relative. Discharged via wheelchair, hospital escort: Yes.  Special equipment needed: Not Applicable    Be sure to schedule a follow-up appointment with your primary care doctor or any specialists as instructed.     Discharge Plan:   Diet Plan: Discussed  Activity Level: Discussed  Smoking Cessation Offered: Patient Refused  Confirmed Follow up Appointment: Appointment Scheduled  Confirmed Symptoms Management: Discussed  Medication Reconciliation Updated: Yes    I understand that a diet low in cholesterol, fat, and sodium is recommended for good health. Unless I have been given specific instructions below for another diet, I accept this instruction as my diet prescription.   Other diet: cardiac    Special Instructions: None    · Is patient discharged on Warfarin / Coumadin?   No     Depression / Suicide Risk    As you are discharged from this [facility] facility, it is important to learn how to keep safe from harming yourself.    Recognize the warning signs:  · Abrupt changes in personality, positive or negative- including increase in energy   · Giving away possessions  · Change in eating patterns- significant weight changes-  positive or negative  · Change in sleeping patterns- unable to sleep or sleeping all the time   · Unwillingness or inability to communicate  · Depression  · Unusual sadness, discouragement and loneliness  · Talk of wanting to die  · Neglect of personal appearance   · Rebelliousness- reckless behavior  · Withdrawal from people/activities they love  · Confusion- inability to concentrate     If you or a loved one observes any of these behaviors or has concerns about self-harm, here's what you can do:  · Talk about it- your feelings and reasons for harming yourself  · Remove any means that you might use to hurt yourself (examples: pills, rope, extension cords, firearm)  · Get professional help from the community (Mental Health, Substance  Abuse, psychological counseling)  · Do not be alone:Call your Safe Contact- someone whom you trust who will be there for you.  · Call your local CRISIS HOTLINE 391-7663 or 806-288-2057  · Call your local Children's Mobile Crisis Response Team [place] (924) 387-3501 or www.[employer]  · Call the toll free National Suicide Prevention Hotlines   · National Suicide Prevention Lifeline 467-919-ILNJ (5645)  · National Hope Line Network 800-SUICIDE (501-9810)

## 2020-05-23 NOTE — CARE PLAN
Problem: Communication  Goal: The ability to communicate needs accurately and effectively will improve  5/23/2020 1341 by Gonzalez Palmer R.N.  Outcome: MET  5/23/2020 1058 by Gonzalez Palmer R.N.  Outcome: PROGRESSING AS EXPECTED     Problem: Safety  Goal: Will remain free from injury  5/23/2020 1341 by Gonzalez Palmer R.N.  Outcome: MET  5/23/2020 1058 by Gonzalez Palmer R.N.  Outcome: PROGRESSING AS EXPECTED  Goal: Will remain free from falls  5/23/2020 1341 by Gonzalez Palmer R.N.  Outcome: MET  5/23/2020 1058 by Gonzalez Palmer R.N.  Outcome: PROGRESSING AS EXPECTED     Problem: Infection  Goal: Will remain free from infection  Outcome: MET     Problem: Venous Thromboembolism (VTW)/Deep Vein Thrombosis (DVT) Prevention:  Goal: Patient will participate in Venous Thrombosis (VTE)/Deep Vein Thrombosis (DVT)Prevention Measures  Outcome: MET     Problem: Bowel/Gastric:  Goal: Normal bowel function is maintained or improved  Outcome: MET  Goal: Will not experience complications related to bowel motility  Outcome: MET     Problem: Knowledge Deficit  Goal: Knowledge of disease process/condition, treatment plan, diagnostic tests, and medications will improve  5/23/2020 1341 by Gonzalez Palmer R.N.  Outcome: MET  5/23/2020 1058 by Gonzalez Palmer R.N.  Outcome: PROGRESSING AS EXPECTED  Goal: Knowledge of the prescribed therapeutic regimen will improve  5/23/2020 1341 by oGnzalez Palmer R.N.  Outcome: MET  5/23/2020 1058 by Gonzalez Palmer R.N.  Outcome: PROGRESSING AS EXPECTED     Problem: Discharge Barriers/Planning  Goal: Patient's continuum of care needs will be met  5/23/2020 1341 by Gonzalez Palmer R.N.  Outcome: MET  5/23/2020 1058 by Gonzalez Palmer R.N.  Outcome: PROGRESSING AS EXPECTED     Problem: Fluid Volume:  Goal: Will maintain balanced intake and output  Outcome: MET     Problem: Pain Management  Goal: Pain level will decrease to patient's comfort goal  Outcome: MET     Problem: Psychosocial Needs:  Goal: Level of anxiety will  decrease  Outcome: MET     Problem: Respiratory:  Goal: Respiratory status will improve  Outcome: MET     Problem: Psychosocial Needs:  Goal: Ability to adjust to condition or change in health will improve  Outcome: MET  Goal: Mental status will improve  Outcome: MET     Problem: Cardiac:  Goal: Cardiovascular alteration will improve  Outcome: MET  Goal: Hemodynamic stability will improve  Outcome: MET     Problem: Fluid Volume:  Goal: Risk for excess fluid volume will decrease  Outcome: MET     Problem: Respiratory:  Goal: Respiratory status will improve  Outcome: MET  Goal: Ability to maintain a clear airway will improve  Outcome: MET     Problem: Nutritional:  Goal: Ability to attain and maintain optimal nutritional status will improve  Outcome: MET  Goal: Ability to identify appropriate dietary choices will improve  Outcome: MET     Problem: Mobility:  Goal: Capacity to carry out activities will improve  Outcome: MET     Problem: Physical Regulation:  Goal: Complications related to the disease process, condition or treatment will be avoided or minimized  Outcome: MET  Goal: Diagnostic test results will improve  Outcome: MET     Problem: Knowledge Deficit:  Goal: Knowledge of the prescribed therapeutic regimen will improve  5/23/2020 1341 by Gonzalez Palmer RKarriN.  Outcome: MET  5/23/2020 1058 by Gonzalez Palmer R.N.  Outcome: PROGRESSING AS EXPECTED  Goal: Understanding of discharge needs will improve  Outcome: MET     Problem: Health Behavior:  Goal: Identification of ways to alter habits to positively affect health status will improve  Outcome: MET  Goal: Ability to seek appropriate health care will improve  Outcome: MET

## 2020-05-23 NOTE — CARE PLAN
Problem: Pain Management  Goal: Pain level will decrease to patient's comfort goal  Outcome: PROGRESSING SLOWER THAN EXPECTED  Note: Pt requesting another one time dose of IV Toradol for pain to get her through the night. RN paged hospitalist and received order.      Problem: Psychosocial Needs:  Goal: Level of anxiety will decrease  Outcome: PROGRESSING SLOWER THAN EXPECTED  Flowsheets (Taken 5/22/2020 2000)  Patient Behaviors: (demanding)   Anxious   Tearful   Non Compliant  Note: Pt is also demanding.      Problem: Respiratory:  Goal: Respiratory status will improve  Outcome: PROGRESSING SLOWER THAN EXPECTED  Intervention: Assess and monitor pulmonary status  Flowsheets (Taken 5/22/2020 2000)  Respiratory Pattern:   Dyspnea at Rest   Dyspnea with Exertion   Labored  Work Of Breathing / Effort:   Moderate   Accessory Muscle Use   Increased Work of Breathing  Cough:   Moist   Non Productive   Strong     Problem: Respiratory:  Goal: Respiratory status will improve  Outcome: PROGRESSING SLOWER THAN EXPECTED  Intervention: Assess and monitor pulmonary status  Flowsheets (Taken 5/22/2020 2000)  Respiratory Pattern:   Dyspnea at Rest   Dyspnea with Exertion   Labored  Work Of Breathing / Effort:   Moderate   Accessory Muscle Use   Increased Work of Breathing  Cough:   Moist   Non Productive   Strong

## 2020-05-23 NOTE — CARE PLAN
Problem: Communication  Goal: The ability to communicate needs accurately and effectively will improve  Outcome: PROGRESSING AS EXPECTED     Problem: Safety  Goal: Will remain free from injury  Outcome: PROGRESSING AS EXPECTED  Goal: Will remain free from falls  Outcome: PROGRESSING AS EXPECTED     Problem: Knowledge Deficit  Goal: Knowledge of disease process/condition, treatment plan, diagnostic tests, and medications will improve  Outcome: PROGRESSING AS EXPECTED  Goal: Knowledge of the prescribed therapeutic regimen will improve  Outcome: PROGRESSING AS EXPECTED     Problem: Discharge Barriers/Planning  Goal: Patient's continuum of care needs will be met  Outcome: PROGRESSING AS EXPECTED     Problem: Knowledge Deficit:  Goal: Knowledge of the prescribed therapeutic regimen will improve  Outcome: PROGRESSING AS EXPECTED

## 2020-05-23 NOTE — PROGRESS NOTES
"Bedside report received.  Assessment complete.  A&O x 4. Patient calls appropriately.  Patient ambulates with no assist. Bed alarm off.   Patient has 0/10 pain. Pain managed with prescribed medications.  Denies N&V. Tolerating cardiac diet.  + void, + flatus, + BM.  Patient denies SOB.  SCD's off.  Patient is calm and cooperative.  Review plan with of care with patient. Call light and personal belongings with in reach. Hourly rounding in place. All needs met at this time.  /88   Pulse 68   Temp 36.8 °C (98.3 °F) (Temporal)   Resp 16   Ht 1.57 m (5' 1.81\")   Wt 45.9 kg (101 lb 3.1 oz)   SpO2 100%   BMI 18.62 kg/m²     "

## 2020-05-23 NOTE — PROGRESS NOTES
"Pt A&O x4. Demanding, tearful, anxious, non-compliant.     Vitals: /83   Pulse 71   Temp 36.9 °C (98.4 °F) (Temporal)   Resp 18   Ht 1.57 m (5' 1.81\")   Wt 45.9 kg (101 lb 3.1 oz)   SpO2 96%   BMI 18.62 kg/m²     Pt rates pain 7 out of 10. Medicated for pain. Pt requesting another dose of IV Toradol as she received last night, paged on-call hospitalist, received another one time dose.     Neuro: WHITESIDE. Denies new onset of numbness/ tingling.    Cardiac: Denies new onset of chest pain.    Vascular: Pulses 2+ BUE, BLE. No edema noted. BLE pale, cool to touch.    Respiratory: Lungs sound diminished throughout. Refusing to use IS, \"I hate that thing\". Pt states she is using flutter valve however yusuf not demonstrate in font of RN. On 2L O2 NC with humidification. Refusing to use . States she is having shortness of breath \"off and on\". Appears to have dyspnea with and without exertion. Pt work of breathing is moderate with accessory muscle use.     GI: + BM today. Stool remains loose/ watery, however, \"they are slowing down\". On cardiac diet, tolerating well. - nausea/ vomiting.    : Pt voiding independently.      MSK: Pt up to bathroom self no assistance required, generalized weakness, steady/ shuffle gait. Pt able to walk to nurses station to make requests. Pt likes to remain in the chair.     Integumentary: Germanized pale, flaky, fragile skin. Scabbing to right shoulder and RLE/ foot. Loose/ poor turgor.     Labs noted.    Fall precautions in place: Bed locked in lowest position, Upper bed rails up, treaded socks in place, personal belongings within reach, call light within reach, appropriate mobility signs in place, - bed alarm. Pt calls appropriately. Refusing SCD's.     Pt updated on POC.     Pt's  called concerned about transportation tomorrow if pt is discharged. Pt and her  live in [place] which is a 6 hour drive and  is concerned that there are no places to stay overnight in " [place] due to the Coronavirus.  states he is unable to drive 12 hours to [place] and back in one day.

## 2020-05-23 NOTE — DISCHARGE SUMMARY
Discharge Summary    CHIEF COMPLAINT ON ADMISSION  Constipation    Reason for Admission  Constipation    Admission Date  5/18/2020    CODE STATUS  Full Code    HPI & HOSPITAL COURSE  Ms. Paredes is a 59-year-old female who was air flighted from a hospital in [place], [place] for constipation and pneumonia.  He has a past medical history of oxygen dependent COPD.  She reportedly had not had a bowel movement in over 2 weeks.  Abdominal x-ray revealed a high fecal impaction and empty rectum.  She has also been notably short of breath x2 weeks.  A chest x-ray was done and showed increased intravascular congestion and a left-sided pleural effusion.  A CT of the abdomen was also done and noted a large amount of stool within the colon but no intraperitoneal air or free fluid.  A CT scan was also done of the chest on 5/17/2020 and found a small left pleural effusion with atelectatic changes at the left lung base with an acute and subacute fracture of the left sixth and ninth rib. She was subsequently admitted to the hospital for further evaluation and treatment where a thoracentesis was done on 5/19/2020 with 60 cc taken off, fluid analysis was transudative. An echocardiogram was also done and showed an ejection fraction of 30% with grade III diastolic dysfunction. Cardiology was consulted and placed her on the appropriate medication regimen. A stress test was also done and showed no evidence of ischemia or infarct, but was consistent with dilated cardiomyopathy. Surgery was consulted for her possible large bowel obstruction and on 5/20/2020 a diagnostic enema was done, which was negative for obstruction. She started having bowel movements after that was performed and has had no issues with that since. On the day of discharge her vital signs and lab work are stable and she is on less than her baseline level of home oxygen. She has been cleared for discharge today from medicine, surgery, and cardiology standpoints     Therefore,  she is discharged in guarded and stable condition to home with close outpatient follow-up.    The patient met 2-midnight criteria for an inpatient stay at the time of discharge.    Discharge Date  5/23/2020    FOLLOW UP ITEMS POST DISCHARGE  PCP in 7-10 days.   Cardiology in 1-2 weeks.     DISCHARGE DIAGNOSES  Principal Problem (Resolved):    Slow transit constipation POA: Yes  Active Problems:    Chronic obstructive pulmonary disease with acute exacerbation (HCC) (Chronic) POA: Yes    Acute on chronic respiratory failure with hypoxia (HCC) POA: Yes    Cardiomyopathy (HCC) POA: Yes    Acute hyponatremia POA: No    Tobacco abuse POA: Yes  Resolved Problems:    Pneumonia POA: Yes    Pleural effusion, left POA: Yes    Closed fracture of multiple ribs of left side, subacute POA: Yes    FOLLOW UP  Michael Pablo M.D.  [address] [facility] [address]  [phone]    Go on 5/27/2020  Please check in for a hospita follow up at 9:30AM. Please gain a referral to Cardiology in [place], [place].     Maikel Castillo M.D.  [address]   [address]  [facility] [address]  [phone]    Schedule an appointment as soon as possible for a visit    MEDICATIONS ON DISCHARGE     Medication List      Start taking these medications      Instructions   albuterol 108 (90 Base) MCG/ACT Aers inhalation aerosol   Inhale 2 Puffs by mouth every 6 hours as needed for Shortness of Breath.  Dose:  2 Puff     aspirin 81 MG EC tablet  Start taking on:  May 24, 2020   Take 1 Tab by mouth every day.  Dose:  81 mg     atorvastatin 40 MG Tabs  Commonly known as:  LIPITOR   Take 1 Tab by mouth every evening.  Dose:  40 mg     lisinopril 2.5 MG Tabs  Start taking on:  May 24, 2020  Commonly known as:  PRINIVIL   Take 1 Tab by mouth every day.  Dose:  2.5 mg     metoprolol SR 25 MG Tb24  Start taking on:  May 24, 2020  Commonly known as:  TOPROL XL   Take 0.5 Tabs by mouth every day.  Dose:  12.5 mg     nicotine 14 MG/24HR Pt24  Commonly known as:  NICODERM    Apply 1 Patch to skin as directed every 24 hours for 14 days.  Dose:  1 Patch     polyethylene glycol/lytes Pack  Start taking on:  May 24, 2020  Commonly known as:  MIRALAX   Take 1 Packet by mouth every day.  Dose:  17 g     spironolactone 25 MG Tabs  Start taking on:  May 24, 2020  Commonly known as:  ALDACTONE   Take 0.5 Tabs by mouth every day.  Dose:  12.5 mg        Change how you take these medications      Instructions   HYDROcodone/acetaminophen  MG Tabs  What changed:  how much to take  Commonly known as:  NORCO   Take 1 Tab by mouth every 6 hours as needed for up to 3 days.  Dose:  1 Tab        Continue taking these medications      Instructions   levalbuterol 0.63 MG/3ML Nebu  Commonly known as:  XOPENEX   3 mL by Nebulization route every four hours as needed for Shortness of Breath.  Dose:  0.63 mg     montelukast 10 MG Tabs  Commonly known as:  SINGULAIR   Take 1 Tab by mouth every day.  Dose:  10 mg     omeprazole 20 MG delayed-release capsule  Commonly known as:  PRILOSEC   Take 1 Cap by mouth every day.  Dose:  20 mg     tizanidine 4 MG Tabs  Commonly known as:  ZANAFLEX   Take 0.25 Tabs by mouth 2 times a day.  Dose:  1 mg     zolpidem 5 MG Tabs  Commonly known as:  AMBIEN   Take 1 Tab by mouth at bedtime as needed for Sleep for up to 3 days.  Dose:  5 mg        Stop taking these medications    amoxicillin-clavulanate 875-125 MG Tabs  Commonly known as:  AUGMENTIN          Allergies  Allergies   Allergen Reactions   • Poliovirus Vaccine Inactivated Anaphylaxis   • Poliovirus Vaccine Live Oral Trivalent Anaphylaxis     DIET  Orders Placed This Encounter   Procedures   • Diet Order Cardiac, Regular     Standing Status:   Standing     Number of Occurrences:   1     Order Specific Question:   Diet:     Answer:   Cardiac [6]     Order Specific Question:   Diet:     Answer:   Regular [1]     ACTIVITY  As tolerated.  Exercise encouraged.  Weight bearing as tolerated    CONSULTATIONS  General  surgery  Cardiology    PROCEDURES  As above    LABORATORY  Lab Results   Component Value Date    SODIUM 132 (L) 05/23/2020    POTASSIUM 3.8 05/23/2020    CHLORIDE 94 (L) 05/23/2020    CO2 29 05/23/2020    GLUCOSE 102 (H) 05/23/2020    BUN 15 05/23/2020    CREATININE 0.46 (L) 05/23/2020      Lab Results   Component Value Date    WBC 10.8 05/23/2020    HEMOGLOBIN 12.5 05/23/2020    HEMATOCRIT 37.8 05/23/2020    PLATELETCT 223 05/23/2020      Total time of the discharge process exceeds 32 minutes.      Electronically signed by:  Susan Mathias, MSN, RN, APRN, ACNPC-AG, CCRN  Nurse Practitioner, [facility]  Work # [phone]    5/23/2020    1:03 PM